# Patient Record
Sex: FEMALE | Race: ASIAN | NOT HISPANIC OR LATINO
[De-identification: names, ages, dates, MRNs, and addresses within clinical notes are randomized per-mention and may not be internally consistent; named-entity substitution may affect disease eponyms.]

---

## 2021-07-12 ENCOUNTER — APPOINTMENT (OUTPATIENT)
Dept: ULTRASOUND IMAGING | Facility: IMAGING CENTER | Age: 66
End: 2021-07-12
Payer: MEDICARE

## 2021-07-12 ENCOUNTER — OUTPATIENT (OUTPATIENT)
Dept: OUTPATIENT SERVICES | Facility: HOSPITAL | Age: 66
LOS: 1 days | End: 2021-07-12
Payer: MEDICARE

## 2021-07-12 DIAGNOSIS — Z00.8 ENCOUNTER FOR OTHER GENERAL EXAMINATION: ICD-10-CM

## 2021-07-12 PROCEDURE — 76700 US EXAM ABDOM COMPLETE: CPT | Mod: 26

## 2021-07-12 PROCEDURE — 76700 US EXAM ABDOM COMPLETE: CPT

## 2021-08-03 PROBLEM — Z00.00 ENCOUNTER FOR PREVENTIVE HEALTH EXAMINATION: Status: ACTIVE | Noted: 2021-08-03

## 2023-05-08 ENCOUNTER — NON-APPOINTMENT (OUTPATIENT)
Age: 68
End: 2023-05-08

## 2023-05-09 ENCOUNTER — APPOINTMENT (OUTPATIENT)
Dept: OPHTHALMOLOGY | Facility: CLINIC | Age: 68
End: 2023-05-09
Payer: MEDICARE

## 2023-05-09 ENCOUNTER — NON-APPOINTMENT (OUTPATIENT)
Age: 68
End: 2023-05-09

## 2023-05-09 PROCEDURE — 92004 COMPRE OPH EXAM NEW PT 1/>: CPT

## 2023-05-09 PROCEDURE — 92286 ANT SGM IMG I&R SPECLR MIC: CPT

## 2023-05-09 PROCEDURE — 92202 OPSCPY EXTND ON/MAC DRAW: CPT

## 2023-08-15 ENCOUNTER — NON-APPOINTMENT (OUTPATIENT)
Age: 68
End: 2023-08-15

## 2023-08-15 ENCOUNTER — APPOINTMENT (OUTPATIENT)
Dept: OPHTHALMOLOGY | Facility: CLINIC | Age: 68
End: 2023-08-15
Payer: MEDICARE

## 2023-08-15 PROCEDURE — 92012 INTRM OPH EXAM EST PATIENT: CPT

## 2023-08-15 PROCEDURE — 92134 CPTRZ OPH DX IMG PST SGM RTA: CPT

## 2024-03-27 ENCOUNTER — OUTPATIENT (OUTPATIENT)
Dept: OUTPATIENT SERVICES | Facility: HOSPITAL | Age: 69
LOS: 1 days | End: 2024-03-27
Payer: MEDICARE

## 2024-03-27 ENCOUNTER — APPOINTMENT (OUTPATIENT)
Dept: CT IMAGING | Facility: IMAGING CENTER | Age: 69
End: 2024-03-27
Payer: MEDICARE

## 2024-03-27 DIAGNOSIS — R10.11 RIGHT UPPER QUADRANT PAIN: ICD-10-CM

## 2024-03-27 DIAGNOSIS — R10.10 UPPER ABDOMINAL PAIN, UNSPECIFIED: ICD-10-CM

## 2024-03-27 PROCEDURE — 74160 CT ABDOMEN W/CONTRAST: CPT | Mod: 26

## 2024-03-27 PROCEDURE — 74160 CT ABDOMEN W/CONTRAST: CPT

## 2024-04-02 ENCOUNTER — APPOINTMENT (OUTPATIENT)
Dept: CT IMAGING | Facility: IMAGING CENTER | Age: 69
End: 2024-04-02

## 2024-04-08 ENCOUNTER — APPOINTMENT (OUTPATIENT)
Dept: GASTROENTEROLOGY | Facility: CLINIC | Age: 69
End: 2024-04-08
Payer: MEDICARE

## 2024-04-08 DIAGNOSIS — K83.8 OTHER SPECIFIED DISEASES OF BILIARY TRACT: ICD-10-CM

## 2024-04-08 DIAGNOSIS — K86.2 CYST OF PANCREAS: ICD-10-CM

## 2024-04-08 PROCEDURE — 99443: CPT

## 2024-04-09 ENCOUNTER — APPOINTMENT (OUTPATIENT)
Dept: GASTROENTEROLOGY | Facility: HOSPITAL | Age: 69
End: 2024-04-09

## 2024-04-16 ENCOUNTER — OUTPATIENT (OUTPATIENT)
Dept: OUTPATIENT SERVICES | Facility: HOSPITAL | Age: 69
LOS: 1 days | End: 2024-04-16

## 2024-04-16 VITALS
TEMPERATURE: 98 F | OXYGEN SATURATION: 97 % | WEIGHT: 117.95 LBS | SYSTOLIC BLOOD PRESSURE: 135 MMHG | RESPIRATION RATE: 16 BRPM | DIASTOLIC BLOOD PRESSURE: 79 MMHG | HEIGHT: 60 IN | HEART RATE: 74 BPM

## 2024-04-16 DIAGNOSIS — K83.8 OTHER SPECIFIED DISEASES OF BILIARY TRACT: ICD-10-CM

## 2024-04-16 DIAGNOSIS — Z86.73 PERSONAL HISTORY OF TRANSIENT ISCHEMIC ATTACK (TIA), AND CEREBRAL INFARCTION WITHOUT RESIDUAL DEFICITS: ICD-10-CM

## 2024-04-16 DIAGNOSIS — E11.9 TYPE 2 DIABETES MELLITUS WITHOUT COMPLICATIONS: ICD-10-CM

## 2024-04-16 DIAGNOSIS — K86.2 CYST OF PANCREAS: ICD-10-CM

## 2024-04-16 DIAGNOSIS — I10 ESSENTIAL (PRIMARY) HYPERTENSION: ICD-10-CM

## 2024-04-16 DIAGNOSIS — Z90.89 ACQUIRED ABSENCE OF OTHER ORGANS: Chronic | ICD-10-CM

## 2024-04-16 RX ORDER — METFORMIN HYDROCHLORIDE 850 MG/1
2 TABLET ORAL
Refills: 0 | DISCHARGE

## 2024-04-16 RX ORDER — SODIUM CHLORIDE 9 MG/ML
3 INJECTION INTRAMUSCULAR; INTRAVENOUS; SUBCUTANEOUS EVERY 8 HOURS
Refills: 0 | Status: DISCONTINUED | OUTPATIENT
Start: 2024-04-18 | End: 2024-05-02

## 2024-04-16 RX ORDER — SODIUM CHLORIDE 9 MG/ML
1000 INJECTION, SOLUTION INTRAVENOUS
Refills: 0 | Status: DISCONTINUED | OUTPATIENT
Start: 2024-04-18 | End: 2024-05-02

## 2024-04-16 NOTE — H&P PST ADULT - HISTORY OF PRESENT ILLNESS
69 year old female with c/o abdominal pain, had CT scan done a few weeks ago which revealed dilated bile duct and pancreatic cyst. Pt presents today for presurgical evaluation for ... 69 year old female with c/o abdominal pain, had CT scan done a few weeks ago which revealed dilated bile duct and pancreatic cyst. Pt presents today for presurgical evaluation for Endoscopic Ultrasound.

## 2024-04-16 NOTE — H&P PST ADULT - NSICDXPASTMEDICALHX_GEN_ALL_CORE_FT
PAST MEDICAL HISTORY:  Asthma     Diabetes     H/O fracture of wrist     Hypertension     TIA (transient ischemic attack)     Uterine fibroid

## 2024-04-16 NOTE — H&P PST ADULT - PROBLEM SELECTOR PLAN 3
Pt states Farxiga was discontinued by PCP (last dose was 4/11/24).   Pt instructed to hold Glipizide and Metformin the morning of surgery.

## 2024-04-16 NOTE — H&P PST ADULT - PROBLEM SELECTOR PLAN 1
Pre-op instructions provided. Pt verbalized understanding.   Pt to take own Pepcid for GI ppx.   Labs from 3/25/24 in chart.

## 2024-04-16 NOTE — H&P PST ADULT - NSICDXPASTSURGICALHX_GEN_ALL_CORE_FT
PAST SURGICAL HISTORY:  Delivery By Elective Caesarean Section     H/O abdominal hysterectomy     Hx of cholecystectomy     S/P tonsillectomy     Tubal ligation status

## 2024-04-17 PROBLEM — Z87.81 PERSONAL HISTORY OF (HEALED) TRAUMATIC FRACTURE: Chronic | Status: ACTIVE | Noted: 2024-04-16

## 2024-04-17 PROBLEM — D25.9 LEIOMYOMA OF UTERUS, UNSPECIFIED: Chronic | Status: ACTIVE | Noted: 2024-04-16

## 2024-04-18 ENCOUNTER — TRANSCRIPTION ENCOUNTER (OUTPATIENT)
Age: 69
End: 2024-04-18

## 2024-04-18 ENCOUNTER — OUTPATIENT (OUTPATIENT)
Dept: OUTPATIENT SERVICES | Facility: HOSPITAL | Age: 69
LOS: 1 days | Discharge: HOME | End: 2024-04-18
Payer: MEDICARE

## 2024-04-18 ENCOUNTER — RESULT REVIEW (OUTPATIENT)
Age: 69
End: 2024-04-18

## 2024-04-18 ENCOUNTER — APPOINTMENT (OUTPATIENT)
Dept: GASTROENTEROLOGY | Facility: HOSPITAL | Age: 69
End: 2024-04-18

## 2024-04-18 VITALS
DIASTOLIC BLOOD PRESSURE: 78 MMHG | RESPIRATION RATE: 16 BRPM | OXYGEN SATURATION: 98 % | SYSTOLIC BLOOD PRESSURE: 141 MMHG | WEIGHT: 119.05 LBS | TEMPERATURE: 97 F | HEART RATE: 70 BPM

## 2024-04-18 VITALS
OXYGEN SATURATION: 100 % | SYSTOLIC BLOOD PRESSURE: 138 MMHG | DIASTOLIC BLOOD PRESSURE: 58 MMHG | HEART RATE: 58 BPM | RESPIRATION RATE: 12 BRPM

## 2024-04-18 DIAGNOSIS — Z90.89 ACQUIRED ABSENCE OF OTHER ORGANS: Chronic | ICD-10-CM

## 2024-04-18 DIAGNOSIS — K86.2 CYST OF PANCREAS: ICD-10-CM

## 2024-04-18 LAB — GLUCOSE BLDC GLUCOMTR-MCNC: 272 MG/DL — HIGH (ref 70–99)

## 2024-04-18 PROCEDURE — 43237 ENDOSCOPIC US EXAM ESOPH: CPT

## 2024-04-18 PROCEDURE — 88305 TISSUE EXAM BY PATHOLOGIST: CPT | Mod: 26

## 2024-04-18 PROCEDURE — 43239 EGD BIOPSY SINGLE/MULTIPLE: CPT

## 2024-04-18 RX ORDER — ATORVASTATIN CALCIUM 80 MG/1
1 TABLET, FILM COATED ORAL
Refills: 0 | DISCHARGE

## 2024-04-18 RX ORDER — DAPAGLIFLOZIN 10 MG/1
1 TABLET, FILM COATED ORAL
Refills: 0 | DISCHARGE

## 2024-04-18 RX ORDER — FAMOTIDINE 10 MG/ML
1 INJECTION INTRAVENOUS
Refills: 0 | DISCHARGE

## 2024-04-18 RX ORDER — IBUPROFEN 200 MG
1 TABLET ORAL
Refills: 0 | DISCHARGE

## 2024-04-18 RX ORDER — METFORMIN HYDROCHLORIDE 850 MG/1
2 TABLET ORAL
Refills: 0 | DISCHARGE

## 2024-04-18 RX ORDER — LOSARTAN POTASSIUM 100 MG/1
1 TABLET, FILM COATED ORAL
Refills: 0 | DISCHARGE

## 2024-04-18 RX ORDER — ALBUTEROL 90 UG/1
2 AEROSOL, METERED ORAL
Refills: 0 | DISCHARGE

## 2024-04-18 RX ORDER — CLOPIDOGREL BISULFATE 75 MG/1
1 TABLET, FILM COATED ORAL
Refills: 0 | DISCHARGE

## 2024-04-18 RX ORDER — BUDESONIDE AND FORMOTEROL FUMARATE DIHYDRATE 160; 4.5 UG/1; UG/1
2 AEROSOL RESPIRATORY (INHALATION)
Refills: 0 | DISCHARGE

## 2024-04-18 RX ORDER — FERROUS GLUCONATE 100 %
0 POWDER (GRAM) MISCELLANEOUS
Refills: 0 | DISCHARGE

## 2024-04-18 RX ORDER — HYDROCHLOROTHIAZIDE 25 MG
1 TABLET ORAL
Refills: 0 | DISCHARGE

## 2024-04-18 NOTE — PRE PROCEDURE NOTE - PRE PROCEDURE EVALUATION
Attending Physician:  Dr. Singh    Procedure: EUS    Indication for Procedure: Abnormal CT  ________________________________________________________  PAST MEDICAL & SURGICAL HISTORY:  Diabetes      Hypertension      Asthma      TIA (transient ischemic attack)      Uterine fibroid      H/O fracture of wrist      Tubal ligation status      H/O abdominal hysterectomy      Hx of cholecystectomy      Delivery By Elective Caesarean Section      S/P tonsillectomy        ALLERGIES:  Tylenol (Hives)    HOME MEDICATIONS:  Albuterol (Eqv-ProAir HFA) 90 mcg/inh inhalation aerosol: 2 puff(s) inhaled every 4 hours as needed for  shortness of breath and/or wheezing  atorvastatin 20 mg oral tablet: 1 tab(s) orally once a day  calcium + Vitamin D: 1 tab daily  clopidogrel 75 mg oral tablet: 1 tab(s) orally once a day  famotidine 20 mg oral tablet: 1 tab(s) orally 2 times a day  Farxiga 10 mg oral tablet: 1 tab(s) orally once a day last dose taken on 4/11/24 (medication was discontinued)  ferrous gluconate 325 mg (36 mg elemental iron) oral tablet: orally once a day  GlipiZIDE XL 10 mg oral tablet, extended release: 1 tab(s) orally once a day  hydroCHLOROthiazide 12.5 mg oral tablet: 1 tab(s) orally once a day  ibuprofen 400 mg oral tablet: 1 tab(s) orally every 8 hours as needed for  moderate pain  losartan 50 mg oral tablet: 1 tab(s) orally once a day  MetFORMIN (Eqv-Fortamet) 500 mg oral tablet, extended release: 2 tab(s) orally 2 times a day  Multiple Vitamins oral tablet: 1 tab(s) orally once a day  Symbicort 160 mcg-4.5 mcg/inh inhalation aerosol: 2 puff(s) inhaled 2 times a day as needed for  shortness of breath and/or wheezing  Vitamin B 12 1 tab daily:   Vitamin E  1 tab daily:     AICD/PPM: [ ] yes   [ ] no    PERTINENT LAB DATA:                      PHYSICAL EXAMINATION:      Weight (kg): 54T(C): 36.7  HR: 60  BP: 132/57  RR: 13  SpO2: 97%    Constitutional: NAD  HEENT: PERRLA, EOMI,    Neck:  No JVD  Respiratory: CTAB/L  Cardiovascular: S1 and S2  Gastrointestinal: BS+, soft, NT/ND  Extremities: No peripheral edema  Neurological: A/O x 3, no focal deficits  Psychiatric: Normal mood, normal affect  Skin: No rashes    ASA Class: I [ ]  II [ ]  III [x]  IV [ ]    COMMENTS:    The patient is a suitable candidate for the planned procedure unless box checked [ ]  No, explain:    Risks and alternatives to the procedure discussed in detail. All questions answered.

## 2024-04-24 LAB — SURGICAL PATHOLOGY STUDY: SIGNIFICANT CHANGE UP

## 2024-04-25 ENCOUNTER — NON-APPOINTMENT (OUTPATIENT)
Age: 69
End: 2024-04-25

## 2024-05-08 ENCOUNTER — APPOINTMENT (OUTPATIENT)
Dept: MRI IMAGING | Facility: IMAGING CENTER | Age: 69
End: 2024-05-08
Payer: MEDICARE

## 2024-05-08 ENCOUNTER — OUTPATIENT (OUTPATIENT)
Dept: OUTPATIENT SERVICES | Facility: HOSPITAL | Age: 69
LOS: 1 days | End: 2024-05-08
Payer: MEDICARE

## 2024-05-08 DIAGNOSIS — Z00.8 ENCOUNTER FOR OTHER GENERAL EXAMINATION: ICD-10-CM

## 2024-05-08 DIAGNOSIS — Z90.89 ACQUIRED ABSENCE OF OTHER ORGANS: Chronic | ICD-10-CM

## 2024-05-08 PROCEDURE — A9585: CPT

## 2024-05-08 PROCEDURE — 74183 MRI ABD W/O CNTR FLWD CNTR: CPT | Mod: 26

## 2024-05-08 PROCEDURE — 74183 MRI ABD W/O CNTR FLWD CNTR: CPT

## 2024-05-13 ENCOUNTER — APPOINTMENT (OUTPATIENT)
Dept: GASTROENTEROLOGY | Facility: HOSPITAL | Age: 69
End: 2024-05-13

## 2024-06-20 ENCOUNTER — APPOINTMENT (OUTPATIENT)
Dept: PULMONOLOGY | Facility: CLINIC | Age: 69
End: 2024-06-20
Payer: MEDICARE

## 2024-06-20 VITALS
HEIGHT: 60 IN | TEMPERATURE: 96.8 F | HEART RATE: 88 BPM | WEIGHT: 122 LBS | OXYGEN SATURATION: 98 % | SYSTOLIC BLOOD PRESSURE: 112 MMHG | DIASTOLIC BLOOD PRESSURE: 68 MMHG | BODY MASS INDEX: 23.95 KG/M2

## 2024-06-20 DIAGNOSIS — J45.909 UNSPECIFIED ASTHMA, UNCOMPLICATED: ICD-10-CM

## 2024-06-20 DIAGNOSIS — R91.1 SOLITARY PULMONARY NODULE: ICD-10-CM

## 2024-06-20 PROCEDURE — 99204 OFFICE O/P NEW MOD 45 MIN: CPT | Mod: 25

## 2024-06-20 PROCEDURE — 94729 DIFFUSING CAPACITY: CPT

## 2024-06-20 PROCEDURE — 94727 GAS DIL/WSHOT DETER LNG VOL: CPT

## 2024-06-20 PROCEDURE — 94060 EVALUATION OF WHEEZING: CPT

## 2024-06-20 NOTE — HISTORY OF PRESENT ILLNESS
[Never] : never [TextBox_4] : She is a 69-year-old woman with a history of asthma.  She has had asthma all of her life.  She has been on Symbicort and albuterol.  She has not been using these medications regularly.  She has not used either one of the inhalers in over 1 month.  There is no complaint of cough, wheezing or shortness of breath.  No sinus symptoms.  No GERD.  No chest pain or pressure.  She never smoked.  She was evaluated for abdominal pain.  CT of the abdomen showed an incidental finding of a left lower lobe pulmonary nodule.  No other nodules were reported.   [Awakes Unrefreshed] : does not awaken unrefreshed [Difficulty Maintaining Sleep] : does not have difficulty maintaining sleep

## 2024-06-20 NOTE — PHYSICAL EXAM
[No Acute Distress] : no acute distress [Normal Oropharynx] : normal oropharynx [Normal Appearance] : normal appearance [Normal Rate/Rhythm] : normal rate/rhythm [Normal S1, S2] : normal s1, s2 [No Resp Distress] : no resp distress [Clear to Auscultation Bilaterally] : clear to auscultation bilaterally [No HSM] : no hsm [Normal Gait] : normal gait [No Clubbing] : no clubbing [No Cyanosis] : no cyanosis [No Edema] : no edema [Normal Turgor] : normal turgor [No Focal Deficits] : no focal deficits [Oriented x3] : oriented x3 [Normal Affect] : normal affect

## 2024-06-20 NOTE — DISCUSSION/SUMMARY
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

## 2024-06-20 NOTE — PROCEDURE
[FreeTextEntry1] : PFT 6/20/24: Small airways disease.  Lung volumes were normal.  Diffusion was normal.  Significant improvement noted after inhalation of a bronchodilator.  CT Chest 3/27/24: 3 mm nodule in the left lower lobe.  See report for the additional abdominal findings.

## 2024-06-20 NOTE — REVIEW OF SYSTEMS
[Diabetes] : diabetes [Fever] : no fever [Fatigue] : no fatigue [Nasal Congestion] : no nasal congestion [Sinus Problems] : no sinus problems [Cough] : no cough [Sputum] : no sputum [Dyspnea] : no dyspnea [Chest Discomfort] : no chest discomfort [Edema] : no edema [Hay Fever] : no hay fever [GERD] : no gerd [Myalgias] : no myalgias [Rash] : no rash [Anemia] : no anemia [Headache] : no headache [Thyroid Problem] : no thyroid problem

## 2024-06-20 NOTE — DISCUSSION/SUMMARY
[FreeTextEntry1] : Impression 3 mm pulmonary nodule -Incidental finding on abdominal CT from March 2024 History of asthma -Reversible airways disease noted on PFT Never smoked  Recommend Follow-up CT at 1 year interval Continue with albuterol as needed -Can resume Symbicort which she was not using if the albuterol use becomes regular I will see her again in 9 months -Sooner if necessary

## 2024-11-04 ENCOUNTER — NON-APPOINTMENT (OUTPATIENT)
Age: 69
End: 2024-11-04

## 2024-11-04 ENCOUNTER — APPOINTMENT (OUTPATIENT)
Dept: OPHTHALMOLOGY | Facility: CLINIC | Age: 69
End: 2024-11-04
Payer: MEDICARE

## 2024-11-04 PROCEDURE — 92134 CPTRZ OPH DX IMG PST SGM RTA: CPT

## 2024-11-04 PROCEDURE — 92014 COMPRE OPH EXAM EST PT 1/>: CPT

## 2025-03-04 ENCOUNTER — APPOINTMENT (OUTPATIENT)
Dept: CT IMAGING | Facility: IMAGING CENTER | Age: 70
End: 2025-03-04
Payer: MEDICARE

## 2025-03-04 ENCOUNTER — OUTPATIENT (OUTPATIENT)
Dept: OUTPATIENT SERVICES | Facility: HOSPITAL | Age: 70
LOS: 1 days | End: 2025-03-04
Payer: COMMERCIAL

## 2025-03-04 DIAGNOSIS — R91.1 SOLITARY PULMONARY NODULE: ICD-10-CM

## 2025-03-04 DIAGNOSIS — Z90.89 ACQUIRED ABSENCE OF OTHER ORGANS: Chronic | ICD-10-CM

## 2025-03-04 DIAGNOSIS — Z00.8 ENCOUNTER FOR OTHER GENERAL EXAMINATION: ICD-10-CM

## 2025-03-04 PROCEDURE — 71250 CT THORAX DX C-: CPT | Mod: 26

## 2025-03-04 PROCEDURE — 71250 CT THORAX DX C-: CPT

## 2025-03-19 ENCOUNTER — APPOINTMENT (OUTPATIENT)
Dept: PULMONOLOGY | Facility: CLINIC | Age: 70
End: 2025-03-19

## 2025-03-19 VITALS
SYSTOLIC BLOOD PRESSURE: 136 MMHG | HEIGHT: 60 IN | DIASTOLIC BLOOD PRESSURE: 70 MMHG | HEART RATE: 97 BPM | OXYGEN SATURATION: 96 % | BODY MASS INDEX: 24.54 KG/M2 | TEMPERATURE: 97.7 F | WEIGHT: 125 LBS

## 2025-03-19 DIAGNOSIS — R91.1 SOLITARY PULMONARY NODULE: ICD-10-CM

## 2025-03-19 DIAGNOSIS — J47.9 BRONCHIECTASIS, UNCOMPLICATED: ICD-10-CM

## 2025-03-19 PROCEDURE — 99214 OFFICE O/P EST MOD 30 MIN: CPT | Mod: 25

## 2025-03-19 PROCEDURE — 94060 EVALUATION OF WHEEZING: CPT

## 2025-03-19 RX ORDER — EMPAGLIFLOZIN AND METFORMIN HYDROCHLORIDE 12.5; 1 MG/1; MG/1
TABLET ORAL
Refills: 0 | Status: ACTIVE | COMMUNITY

## 2025-03-19 RX ORDER — BUDESONIDE AND FORMOTEROL FUMARATE DIHYDRATE 160; 4.5 UG/1; UG/1
160-4.5 AEROSOL RESPIRATORY (INHALATION)
Refills: 0 | Status: ACTIVE | COMMUNITY

## 2025-03-19 RX ORDER — SEMAGLUTIDE 2.68 MG/ML
INJECTION, SOLUTION SUBCUTANEOUS
Refills: 0 | Status: ACTIVE | COMMUNITY

## 2025-03-19 RX ORDER — ATORVASTATIN CALCIUM 10 MG/1
10 TABLET, FILM COATED ORAL
Refills: 0 | Status: ACTIVE | COMMUNITY

## 2025-03-19 RX ORDER — LOSARTAN POTASSIUM 50 MG/1
50 TABLET, FILM COATED ORAL
Refills: 0 | Status: ACTIVE | COMMUNITY

## 2025-03-19 RX ORDER — ALBUTEROL SULFATE 90 UG/1
108 (90 BASE) INHALANT RESPIRATORY (INHALATION)
Refills: 0 | Status: ACTIVE | COMMUNITY

## 2025-05-04 ENCOUNTER — INPATIENT (INPATIENT)
Facility: HOSPITAL | Age: 70
LOS: 4 days | Discharge: HOME HEALTH | End: 2025-05-09
Attending: HOSPITALIST | Admitting: HOSPITALIST
Payer: MEDICARE

## 2025-05-04 VITALS
HEIGHT: 60 IN | DIASTOLIC BLOOD PRESSURE: 71 MMHG | OXYGEN SATURATION: 96 % | WEIGHT: 119.05 LBS | HEART RATE: 131 BPM | RESPIRATION RATE: 18 BRPM | SYSTOLIC BLOOD PRESSURE: 111 MMHG | TEMPERATURE: 97 F

## 2025-05-04 DIAGNOSIS — E87.4 MIXED DISORDER OF ACID-BASE BALANCE: ICD-10-CM

## 2025-05-04 DIAGNOSIS — Z90.89 ACQUIRED ABSENCE OF OTHER ORGANS: Chronic | ICD-10-CM

## 2025-05-04 LAB
ADD ON TEST-SPECIMEN IN LAB: SIGNIFICANT CHANGE UP
ALBUMIN SERPL ELPH-MCNC: 4.7 G/DL — SIGNIFICANT CHANGE UP (ref 3.3–5)
ALP SERPL-CCNC: 47 U/L — SIGNIFICANT CHANGE UP (ref 40–120)
ALT FLD-CCNC: 20 U/L — SIGNIFICANT CHANGE UP (ref 4–33)
ANION GAP SERPL CALC-SCNC: 33 MMOL/L — HIGH (ref 7–14)
ANION GAP SERPL CALC-SCNC: >34 MMOL/L — HIGH (ref 7–14)
ANION GAP SERPL CALC-SCNC: >38 MMOL/L — HIGH (ref 7–14)
APPEARANCE UR: CLEAR — SIGNIFICANT CHANGE UP
AST SERPL-CCNC: 79 U/L — HIGH (ref 4–32)
BACTERIA # UR AUTO: ABNORMAL /HPF
BASOPHILS # BLD AUTO: 0.1 K/UL — SIGNIFICANT CHANGE UP (ref 0–0.2)
BASOPHILS NFR BLD AUTO: 0.5 % — SIGNIFICANT CHANGE UP (ref 0–2)
BILIRUB SERPL-MCNC: 0.2 MG/DL — SIGNIFICANT CHANGE UP (ref 0.2–1.2)
BILIRUB UR-MCNC: NEGATIVE — SIGNIFICANT CHANGE UP
BLOOD GAS VENOUS COMPREHENSIVE RESULT: SIGNIFICANT CHANGE UP
BLOOD GAS VENOUS COMPREHENSIVE RESULT: SIGNIFICANT CHANGE UP
BUN SERPL-MCNC: 15 MG/DL — SIGNIFICANT CHANGE UP (ref 7–23)
BUN SERPL-MCNC: 18 MG/DL — SIGNIFICANT CHANGE UP (ref 7–23)
BUN SERPL-MCNC: 18 MG/DL — SIGNIFICANT CHANGE UP (ref 7–23)
CALCIUM SERPL-MCNC: 10.1 MG/DL — SIGNIFICANT CHANGE UP (ref 8.4–10.5)
CALCIUM SERPL-MCNC: 7.4 MG/DL — LOW (ref 8.4–10.5)
CALCIUM SERPL-MCNC: 8.3 MG/DL — LOW (ref 8.4–10.5)
CAST: 4 /LPF — SIGNIFICANT CHANGE UP (ref 0–4)
CHLORIDE SERPL-SCNC: 105 MMOL/L — SIGNIFICANT CHANGE UP (ref 98–107)
CHLORIDE SERPL-SCNC: 105 MMOL/L — SIGNIFICANT CHANGE UP (ref 98–107)
CHLORIDE SERPL-SCNC: 94 MMOL/L — LOW (ref 98–107)
CO2 SERPL-SCNC: 7 MMOL/L — CRITICAL LOW (ref 22–31)
CO2 SERPL-SCNC: <7 MMOL/L — CRITICAL LOW (ref 22–31)
CO2 SERPL-SCNC: <7 MMOL/L — CRITICAL LOW (ref 22–31)
COLOR SPEC: YELLOW — SIGNIFICANT CHANGE UP
CREAT SERPL-MCNC: 0.71 MG/DL — SIGNIFICANT CHANGE UP (ref 0.5–1.3)
CREAT SERPL-MCNC: 0.84 MG/DL — SIGNIFICANT CHANGE UP (ref 0.5–1.3)
CREAT SERPL-MCNC: 0.94 MG/DL — SIGNIFICANT CHANGE UP (ref 0.5–1.3)
DIFF PNL FLD: NEGATIVE — SIGNIFICANT CHANGE UP
EGFR: 65 ML/MIN/1.73M2 — SIGNIFICANT CHANGE UP
EGFR: 65 ML/MIN/1.73M2 — SIGNIFICANT CHANGE UP
EGFR: 74 ML/MIN/1.73M2 — SIGNIFICANT CHANGE UP
EGFR: 74 ML/MIN/1.73M2 — SIGNIFICANT CHANGE UP
EGFR: 91 ML/MIN/1.73M2 — SIGNIFICANT CHANGE UP
EGFR: 91 ML/MIN/1.73M2 — SIGNIFICANT CHANGE UP
EOSINOPHIL # BLD AUTO: 0.01 K/UL — SIGNIFICANT CHANGE UP (ref 0–0.5)
EOSINOPHIL NFR BLD AUTO: 0 % — SIGNIFICANT CHANGE UP (ref 0–6)
GLUCOSE BLDC GLUCOMTR-MCNC: 225 MG/DL — HIGH (ref 70–99)
GLUCOSE BLDC GLUCOMTR-MCNC: 249 MG/DL — HIGH (ref 70–99)
GLUCOSE BLDC GLUCOMTR-MCNC: 272 MG/DL — HIGH (ref 70–99)
GLUCOSE SERPL-MCNC: 255 MG/DL — HIGH (ref 70–99)
GLUCOSE SERPL-MCNC: 272 MG/DL — HIGH (ref 70–99)
GLUCOSE SERPL-MCNC: 304 MG/DL — HIGH (ref 70–99)
GLUCOSE UR QL: >=1000 MG/DL
HCT VFR BLD CALC: 53.2 % — HIGH (ref 34.5–45)
HGB BLD-MCNC: 16.6 G/DL — HIGH (ref 11.5–15.5)
IANC: 17.92 K/UL — HIGH (ref 1.8–7.4)
IMM GRANULOCYTES NFR BLD AUTO: 1.6 % — HIGH (ref 0–0.9)
KETONES UR-MCNC: >=160 MG/DL
LEUKOCYTE ESTERASE UR-ACNC: ABNORMAL
LYMPHOCYTES # BLD AUTO: 1.99 K/UL — SIGNIFICANT CHANGE UP (ref 1–3.3)
LYMPHOCYTES # BLD AUTO: 9.4 % — LOW (ref 13–44)
MAGNESIUM SERPL-MCNC: 1.8 MG/DL — SIGNIFICANT CHANGE UP (ref 1.6–2.6)
MAGNESIUM SERPL-MCNC: 2.5 MG/DL — SIGNIFICANT CHANGE UP (ref 1.6–2.6)
MCHC RBC-ENTMCNC: 28.8 PG — SIGNIFICANT CHANGE UP (ref 27–34)
MCHC RBC-ENTMCNC: 31.2 G/DL — LOW (ref 32–36)
MCV RBC AUTO: 92.2 FL — SIGNIFICANT CHANGE UP (ref 80–100)
MONOCYTES # BLD AUTO: 0.83 K/UL — SIGNIFICANT CHANGE UP (ref 0–0.9)
MONOCYTES NFR BLD AUTO: 3.9 % — SIGNIFICANT CHANGE UP (ref 2–14)
NEUTROPHILS # BLD AUTO: 17.92 K/UL — HIGH (ref 1.8–7.4)
NEUTROPHILS NFR BLD AUTO: 84.6 % — HIGH (ref 43–77)
NITRITE UR-MCNC: NEGATIVE — SIGNIFICANT CHANGE UP
NRBC # BLD AUTO: 0 K/UL — SIGNIFICANT CHANGE UP (ref 0–0)
NRBC # FLD: 0 K/UL — SIGNIFICANT CHANGE UP (ref 0–0)
NRBC BLD AUTO-RTO: 0 /100 WBCS — SIGNIFICANT CHANGE UP (ref 0–0)
PH UR: 5.5 — SIGNIFICANT CHANGE UP (ref 5–8)
PHOSPHATE SERPL-MCNC: 3.7 MG/DL — SIGNIFICANT CHANGE UP (ref 2.5–4.5)
PHOSPHATE SERPL-MCNC: 6.9 MG/DL — HIGH (ref 2.5–4.5)
PLATELET # BLD AUTO: 413 K/UL — HIGH (ref 150–400)
POTASSIUM SERPL-MCNC: 4.7 MMOL/L — SIGNIFICANT CHANGE UP (ref 3.5–5.3)
POTASSIUM SERPL-MCNC: 4.9 MMOL/L — SIGNIFICANT CHANGE UP (ref 3.5–5.3)
POTASSIUM SERPL-MCNC: SIGNIFICANT CHANGE UP MMOL/L (ref 3.5–5.3)
POTASSIUM SERPL-SCNC: 4.7 MMOL/L — SIGNIFICANT CHANGE UP (ref 3.5–5.3)
POTASSIUM SERPL-SCNC: 4.9 MMOL/L — SIGNIFICANT CHANGE UP (ref 3.5–5.3)
POTASSIUM SERPL-SCNC: SIGNIFICANT CHANGE UP MMOL/L (ref 3.5–5.3)
PROT SERPL-MCNC: 8.3 G/DL — SIGNIFICANT CHANGE UP (ref 6–8.3)
PROT UR-MCNC: 30 MG/DL
RBC # BLD: 5.77 M/UL — HIGH (ref 3.8–5.2)
RBC # FLD: 14.3 % — SIGNIFICANT CHANGE UP (ref 10.3–14.5)
RBC CASTS # UR COMP ASSIST: 0 /HPF — SIGNIFICANT CHANGE UP (ref 0–4)
SODIUM SERPL-SCNC: 139 MMOL/L — SIGNIFICANT CHANGE UP (ref 135–145)
SODIUM SERPL-SCNC: 145 MMOL/L — SIGNIFICANT CHANGE UP (ref 135–145)
SODIUM SERPL-SCNC: 146 MMOL/L — HIGH (ref 135–145)
SP GR SPEC: 1.02 — SIGNIFICANT CHANGE UP (ref 1–1.03)
SQUAMOUS # UR AUTO: 2 /HPF — SIGNIFICANT CHANGE UP (ref 0–5)
TROPONIN T, HIGH SENSITIVITY RESULT: 15 NG/L — SIGNIFICANT CHANGE UP
UROBILINOGEN FLD QL: 0.2 MG/DL — SIGNIFICANT CHANGE UP (ref 0.2–1)
WBC # BLD: 21.19 K/UL — HIGH (ref 3.8–10.5)
WBC # FLD AUTO: 21.19 K/UL — HIGH (ref 3.8–10.5)
WBC UR QL: 24 /HPF — HIGH (ref 0–5)

## 2025-05-04 PROCEDURE — 71045 X-RAY EXAM CHEST 1 VIEW: CPT | Mod: 26

## 2025-05-04 PROCEDURE — 76937 US GUIDE VASCULAR ACCESS: CPT | Mod: 26,59

## 2025-05-04 PROCEDURE — 99291 CRITICAL CARE FIRST HOUR: CPT | Mod: GC

## 2025-05-04 PROCEDURE — 36569 INSJ PICC 5 YR+ W/O IMAGING: CPT

## 2025-05-04 PROCEDURE — 99291 CRITICAL CARE FIRST HOUR: CPT

## 2025-05-04 RX ORDER — ALBUTEROL SULFATE 2.5 MG/3ML
2 VIAL, NEBULIZER (ML) INHALATION EVERY 6 HOURS
Refills: 0 | Status: DISCONTINUED | OUTPATIENT
Start: 2025-05-04 | End: 2025-05-09

## 2025-05-04 RX ORDER — SODIUM BICARBONATE 1 MEQ/ML
0.69 SYRINGE (ML) INTRAVENOUS
Qty: 150 | Refills: 0 | Status: DISCONTINUED | OUTPATIENT
Start: 2025-05-04 | End: 2025-05-04

## 2025-05-04 RX ORDER — ATORVASTATIN CALCIUM 80 MG/1
20 TABLET, FILM COATED ORAL AT BEDTIME
Refills: 0 | Status: DISCONTINUED | OUTPATIENT
Start: 2025-05-04 | End: 2025-05-09

## 2025-05-04 RX ORDER — CLOPIDOGREL BISULFATE 75 MG/1
75 TABLET, FILM COATED ORAL EVERY 24 HOURS
Refills: 0 | Status: DISCONTINUED | OUTPATIENT
Start: 2025-05-04 | End: 2025-05-09

## 2025-05-04 RX ORDER — SODIUM CHLORIDE 9 G/1000ML
1000 INJECTION, SOLUTION INTRAVENOUS
Refills: 0 | Status: DISCONTINUED | OUTPATIENT
Start: 2025-05-04 | End: 2025-05-05

## 2025-05-04 RX ORDER — ENOXAPARIN SODIUM 100 MG/ML
40 INJECTION SUBCUTANEOUS EVERY 24 HOURS
Refills: 0 | Status: DISCONTINUED | OUTPATIENT
Start: 2025-05-04 | End: 2025-05-09

## 2025-05-04 RX ORDER — DEXTROSE 50 % IN WATER 50 %
50 SYRINGE (ML) INTRAVENOUS
Refills: 0 | Status: DISCONTINUED | OUTPATIENT
Start: 2025-05-04 | End: 2025-05-09

## 2025-05-04 RX ORDER — SODIUM BICARBONATE 1 MEQ/ML
50 SYRINGE (ML) INTRAVENOUS ONCE
Refills: 0 | Status: COMPLETED | OUTPATIENT
Start: 2025-05-04 | End: 2025-05-04

## 2025-05-04 RX ORDER — B1/B2/B3/B5/B6/B12/VIT C/FOLIC 500-0.5 MG
1 TABLET ORAL DAILY
Refills: 0 | Status: DISCONTINUED | OUTPATIENT
Start: 2025-05-04 | End: 2025-05-09

## 2025-05-04 RX ORDER — IBUPROFEN 200 MG
400 TABLET ORAL ONCE
Refills: 0 | Status: COMPLETED | OUTPATIENT
Start: 2025-05-04 | End: 2025-05-04

## 2025-05-04 RX ORDER — ONDANSETRON HCL/PF 4 MG/2 ML
4 VIAL (ML) INJECTION ONCE
Refills: 0 | Status: COMPLETED | OUTPATIENT
Start: 2025-05-04 | End: 2025-05-04

## 2025-05-04 RX ADMIN — Medication 50 MILLIEQUIVALENT(S): at 17:26

## 2025-05-04 RX ADMIN — SODIUM CHLORIDE 150 MILLILITER(S): 9 INJECTION, SOLUTION INTRAVENOUS at 21:54

## 2025-05-04 RX ADMIN — Medication 5.4 UNIT(S)/HR: at 21:54

## 2025-05-04 RX ADMIN — Medication 1000 MILLILITER(S): at 17:41

## 2025-05-04 RX ADMIN — Medication 1000 MILLILITER(S): at 21:29

## 2025-05-04 RX ADMIN — Medication 2000 MILLILITER(S): at 15:08

## 2025-05-04 RX ADMIN — Medication 1000 MILLILITER(S): at 20:21

## 2025-05-04 RX ADMIN — Medication 250 MEQ/KG/HR: at 17:26

## 2025-05-04 RX ADMIN — Medication 400 MILLIGRAM(S): at 22:55

## 2025-05-04 RX ADMIN — Medication 4 MILLIGRAM(S): at 15:08

## 2025-05-04 RX ADMIN — Medication 1000 MILLILITER(S): at 15:41

## 2025-05-04 RX ADMIN — Medication 400 MILLIGRAM(S): at 23:55

## 2025-05-04 NOTE — ED PROVIDER NOTE - OBJECTIVE STATEMENT
70yoF PMH DM, recently increased on her ozempic a few days, ago, p/w multiple episodes of nbnb emesis starting 2 days ago, no abd pain, fevers, diarrhea, constipation, sick contacts. pt endorses increased thirst. son states pt does endorse some back pain. Pt denies chest pain, shortness of breath. pt appears lethargic, unwell appearing, kussmaul breathing. 70yoF PMH HTN, CVA no deficits on plavix, DM recently placed on SYnjardy (metformin/empagliflozin) 3 weeks ago, & increased on her ozempic a few days, ago, p/w multiple episodes of nbnb emesis starting 1 day ago, no abd pain, fevers, diarrhea, constipation, sick contacts. pt endorses increased thirst. son states pt does endorse some back pain. Pt denies chest pain, shortness of breath. pt appears lethargic, unwell appearing, kussmaul breathing.

## 2025-05-04 NOTE — ED ADULT NURSE REASSESSMENT NOTE - TEMPERATURE IN FAHRENHEIT (DEGREES F)
[facility]  Discharge Note    Pt discharged with followings belongings:   Dentures: None  Vision - Corrective Lenses: None  Hearing Aid: None  Jewelry: None  Body Piercings Removed: N/A  Clothing: None  Were All Patient Medications Collected?: Not Applicable  Other Valuables: None   Valuables sent home with patient. Valuables retrieved from safe and returned to patient. Patient education on aftercare instructions: yes. Information faxed to N/A by this writer. Patient verbalize understanding of AVS:  No evidence of learning. Status EXAM upon discharge:  Status and Exam  Normal: No  Facial Expression: Flat  Affect: Blunt  Level of Consciousness: Alert  Mood:Normal: No  Mood: Helpless  Motor Activity:Normal: No  Motor Activity: Decreased  Interview Behavior: Uncooperative/Withdrawn, Evasive  Preception: [place] to Person  Attention:Normal: No  Attention: Unable to Concentrate  Thought Processes: Blocking, Flt.of Ideas  Thought Content:Normal: No  Thought Content: Poverty of Content  Hallucinations: None(pt denies)  Delusions: No  Memory:Normal: No  Memory: Poor Recent, Poor Remote  Insight and Judgment: No  Insight and Judgment: Poor Judgment, Poor Insight  Present Suicidal Ideation: No  Present Homicidal Ideation: No      Metabolic Screening:    Lab Results   Component Value Date    LABA1C 4.5 04/08/2021       Lab Results   Component Value Date    CHOL 173 04/08/2021     Lab Results   Component Value Date    TRIG 47 04/08/2021     Lab Results   Component Value Date    HDL 52 04/08/2021     No components found for: [facility]  Lab Results   Component Value Date    LABVLDL 9 04/08/2021       Moiz Bruno RN    Bridge Appointment completed: Reviewed Discharge Instructions with patient. Patient verbalizes understanding and agreement with the discharge plan using the teachback method.      Referral for Outpatient Tobacco Cessation Counseling, upon discharge (tiffanie X if applicable and completed):    ( )  Hospital staff assisted patient to call Quit Line or faxed referral                                   during hospitalization                  (X)  Recognizing danger situations (included triggers and roadblocks), if not completed on admission                    (X)  Coping skills (new ways to manage stress, exercise, relaxation techniques, changing routine, distraction), if not completed on admission                                                           (X)  Basic information about quitting (benefits of quitting, techniques in how to quit, available resources, if not completed on admission  ( ) Referral for counseling faxed to [facility]   ( ) Patient refused referral  ( ) Patient refused counseling  ( ) Patient refused smoking cessation medication upon discharge    Vaccinations (tiffanie X if applicable and completed):  ( ) Patient states already received influenza vaccine elsewhere  ( ) Patient received influenza vaccine during this hospitalization  (X) Patient refused influenza vaccine at this time 97.6

## 2025-05-04 NOTE — PATIENT PROFILE ADULT - FALL HARM RISK - HARM RISK INTERVENTIONS

## 2025-05-04 NOTE — H&P ADULT - NSICDXPASTMEDICALHX_GEN_ALL_CORE_FT
PAST MEDICAL HISTORY:  Asthma     Diabetes     Gastritis     GERD (gastroesophageal reflux disease)     H/O fracture of wrist     Hepatic steatosis     Hypertension     Multiple pulmonary nodules     Pancreatic cyst     Proliferative diabetic retinopathy     Pseudophakia, right eye     TIA (transient ischemic attack)     Uterine fibroid

## 2025-05-04 NOTE — ED ADULT NURSE NOTE - OBJECTIVE STATEMENT
pt alert ,oriented x4.  to spot 24 a. son at bedside. pt c/o vomiting since yesterday with c/o dizziness ,back pains. iv access. awaits md evaluation. will  continue to monitor, await orders

## 2025-05-04 NOTE — H&P ADULT - HISTORY OF PRESENT ILLNESS
HPI: 70yoF PMH HTN, CVA no deficits on plavix, DM recently placed on SYnjardy (metformin/empagliflozin) 3 weeks ago, & increased on her ozempic a few days, ago, p/w multiple episodes of nbnb emesis starting 1 day ago, no abd pain, fevers, diarrhea, constipation, sick contacts. pt endorses increased thirst. son states pt does endorse some back pain. Pt denies chest pain, shortness of breath. pt appears lethargic, unwell appearing, kussmaul breathing. MICU consulted for evaluation of severe abdominal deficits and c/f euglycemic DKA/dehydration    ED course: VS: T 97.3, , /71, RR 18, SpO2 96% RA. In the ED noted to have pH 6.9, Bicarb 8. BS 300s  Treated w/ fluids and bicarb.    HPI: 70yoF PMH HTN, CVA (on plavix monotherapy), T2DM c/b mild proliferative diabetic retinopathy (non-insulin dependent) recently placed on SYnjardy (metformin/empagliflozin) 3 weeks ago, & increased on her ozempic a few days, ago, gastritis, pancreatic cyst, and multiple pulmonary nodules p/w multiple episodes of nbnb emesis that started 1 day ago. Pt's son states that he noticed she was beginning to have symptoms on Thursday. That same day, she experienced some nausea and vomited. The family contacted her PCP who notified her that some of these symptoms may be due to the dose increase of her Ozempic from 0.5-1. She has had fatigue and GI symptoms since starting Ozempic. On Friday, she felt better. Then on Saturday, she began to have recurrent bouts of emesis, nausea, and difficulties with breathing. Pt denies abd pain, fevers, SOB, diarrhea, constipation, sick contacts, chest pain. Review of systems + for polydypsia and back pain.  MICU consulted for evaluation of severe abdominal deficits and c/f  DKA/dehydration    ED course: VS: T 97.3, , /71, RR 18, SpO2 96% RA. PE there positive for lethargy and Kussmaul respirations. In the ED noted to have pH 6.9, Bicarb 8. BS 300s  Treated w/ fluids and bicarb.

## 2025-05-04 NOTE — H&P ADULT - NSHPLABSRESULTS_GEN_ALL_CORE
LABS:                          16.6   21.19 )-----------( 413      ( 04 May 2025 15:14 )             53.2     Hb Trend: 16.6<--  WBC Trend: 21.19<--  Plt Trend: 413<--          05-04    145  |  105  |  15  ----------------------------<  304[H]  4.7   |  7[LL]  |  0.71    Ca    7.4[L]      04 May 2025 19:36  Phos  6.9     05-04  Mg     2.50     05-04    TPro  8.3  /  Alb  4.7  /  TBili  0.2  /  DBili  x   /  AST  79[H]  /  ALT  20  /  AlkPhos  47  05-04        Urinalysis Basic - ( 04 May 2025 19:36 )    Color: Yellow / Appearance: Clear / S.025 / pH: x  Gluc: 304 mg/dL / Ketone: >=160 mg/dL  / Bili: Negative / Urobili: 0.2 mg/dL   Blood: x / Protein: 30 mg/dL / Nitrite: Negative   Leuk Esterase: Trace / RBC: 0 /HPF / WBC 24 /HPF   Sq Epi: x / Non Sq Epi: 2 /HPF / Bacteria: Few /HPF      CAPILLARY BLOOD GLUCOSE      POCT Blood Glucose.: 234 mg/dL (04 May 2025 18:11)  POCT Blood Glucose.: 206 mg/dL (04 May 2025 13:30)          IMAGING:    Chest Xray /: Clear lungs

## 2025-05-04 NOTE — ED ADULT NURSE REASSESSMENT NOTE - NS ED NURSE REASSESS COMMENT FT1
Mobile Critical Care RN: Pt currently A&Ox3. Can't remember birthday at this time. Daughter at bedside says baseline A&Ox4. Pt lethargic but arouses to voice. Denies pain. Maintaining SpO2 >96% on RA. CTAB. Tachypneic 20s. Afebrile. Sinus tach on cardiac monitor 120s-130s. Maintaining MAPs >65. No edema. +2 radial and DP pulses. Soft abdomen, non distended, non tender. Last BM yesterday. Bicarb gtt maintained as ordered. Fourth bolus IVF infusing as ordered. Primafit in place w/ >600cc clear yellow urine. Skin intact. B/l AC 20g in place flushing w/ +blood return. Pt transported to MICU on [facility]. Pt safety maintained.
pt medicated as ordered. placed on c monitor. labs sent. c/o back pains md aware. will continue to monitor
pt remains awake ,alert ,oriented. remins  tachypneic ,tachycardia on c monitor. 2nd piv inserted. labs sent. awaiting icu consult. will continue to monitor
pt remains awake ,alert ,oriented. respirations continue to be labored. denies pain at present. repeat labs sent.  bicarb infusion  infusing as ordered at 250 cc/hr. will continue to monitor. ua,urine culture sent
report received from RN Stefani, pt resting in stretcher, RR noted to be mildly labored, spo2 100% on RA. remains on cardiac monitor noted to be sinus tachycardiac, . awaiting repeat lab results. pending UA. safety maintained.

## 2025-05-04 NOTE — ED PROVIDER NOTE - CLINICAL SUMMARY MEDICAL DECISION MAKING FREE TEXT BOX
70yoF PMH DM, recently increased on her ozempic a few days, ago, p/w multiple episodes of nbnb emesis starting 2 days ago, no abd pain, fevers, diarrhea, constipation, sick contacts. son states pt does endorse some back pain. Pt denies chest pain, shortness of breath. pt appears lethargic, unwell appearing, kussmaul breathing.     suspect possible pancreatitis, sepsis, dehydration, electrolyte abnormalities  euglycemic DKA  will obtain labs, vbg, cxr, give antiemetics, fluids, MICU consult  pH 6.9, bicarb 8, Tco2 70yoF PMH HTN, CVA no deficits on plavix, DM recently placed on SYnjardy (metformin/empagliflozin) 3 weeks ago, & increased on her ozempic a few days, ago, p/w multiple episodes of nbnb emesis starting 1 day ago, no abd pain, fevers, diarrhea, constipation, sick contacts. pt endorses increased thirst. son states pt does endorse some back pain. Pt denies chest pain, shortness of breath. pt appears lethargic, unwell appearing, kussmaul breathing    suspect possible pancreatitis, sepsis, dehydration, electrolyte abnormalities  euglycemic DKA  will obtain labs, vbg, cxr, give antiemetics, fluids, MICU consult  pH 6.9, bicarb 8, Tco2

## 2025-05-04 NOTE — ED ADULT TRIAGE NOTE - WEIGHT METHOD
----- Message from Deja Kumari DO sent at 2/16/2018 10:37 AM CST -----  Patient was found to have a viable intrauterine pregnancy. Can we therefore schedule her for an OB 1 with me in the near future? Thank you        ----- Message -----  From: Morales Nolan MD  Sent: 2/15/2018  11:18 AM  To: [facility] Result Pool         stated

## 2025-05-04 NOTE — H&P ADULT - ATTENDING COMMENTS
70yoF PMH HTN, CVA no deficits on plavix, DM recently placed on SYnjardy (metformin/empagliflozin) 3 weeks ago, and recent increase in her ozempic dose, p/w multiple episodes of emesis and decreased PO intake, admitted to MICU with concern c/w euglycemic diabetic ketoacidosis.   s/p 4 liters of NS in the ED, and bicarb gtt started at 250cc/hour  Insulin gtt, FS q1h, D5LR, BMP with mag and phos and VBG q4h  aggressive electrolyte replacement   pancx, RVP, will monitor off Abx for now  h/o CVA on plavix  DVT ppx lovenox  Full code

## 2025-05-04 NOTE — ED PROVIDER NOTE - CARE PLAN
Principal Discharge DX:	Nausea & vomiting   1 Principal Discharge DX:	Metabolic acidosis with respiratory acidosis

## 2025-05-04 NOTE — ED ADULT TRIAGE NOTE - CHIEF COMPLAINT QUOTE
brought in by EMS from home for dizziness, N/V.  Pt states sudden onset of dizziness with nausea vomiting that started 2pm yesterday. Hx DM2, HTN, HLD, asthma. Fingerstick 119. Dr. Brown came to triage to eval pt. No code stroke called at this time

## 2025-05-04 NOTE — H&P ADULT - NSHPPHYSICALEXAM_GEN_ALL_CORE
LOS:     VITALS:   T(C): 36.4 (05-04-25 @ 17:01), Max: 36.4 (05-04-25 @ 17:01)  HR: 122 (05-04-25 @ 20:20) (122 - 131)  BP: 129/74 (05-04-25 @ 20:20) (111/71 - 132/44)  RR: 28 (05-04-25 @ 20:20) (18 - 30)  SpO2: 99% (05-04-25 @ 20:20) (96% - 100%)    GENERAL: NAD, lying in bed comfortably  HEAD:  Atraumatic, Normocephalic  EYES: EOMI, PERRLA, conjunctiva and sclera clear  ENT: Moist mucous membranes  NECK: Supple, No JVD  CHEST/LUNG: Clear to auscultation bilaterally; No rales, rhonchi, wheezing, or rubs. Unlabored respirations  HEART: Regular rate and rhythm; No murmurs, rubs, or gallops  ABDOMEN: BSx4; Soft, nontender, nondistended  EXTREMITIES:  2+ Peripheral Pulses, brisk capillary refill. No clubbing, cyanosis, or edema  NERVOUS SYSTEM:  A&Ox3, no focal deficits   SKIN: No rashes or lesions LOS:     VITALS:   T(C): 36.4 (05-04-25 @ 17:01), Max: 36.4 (05-04-25 @ 17:01)  HR: 122 (05-04-25 @ 20:20) (122 - 131)  BP: 129/74 (05-04-25 @ 20:20) (111/71 - 132/44)  RR: 28 (05-04-25 @ 20:20) (18 - 30)  SpO2: 99% (05-04-25 @ 20:20) (96% - 100%)    GENERAL: NAD, lying in bed comfortably  HEAD:  Atraumatic, Normocephalic  EYES: EOMI, PERRLA, conjunctiva and sclera clear  ENT: Dry mucous membranes   NECK: Supple, No JVD  CHEST/LUNG: Clear to auscultation bilaterally; No rales, rhonchi, wheezing, or rubs.   HEART: Tachycardic; No murmurs, rubs, or gallops  ABDOMEN: BSx4; Soft, nontender, nondistended. No rigidity or guarding.  EXTREMITIES:  2+ Peripheral Pulses, brisk capillary refill. No clubbing, cyanosis, or edema  NERVOUS SYSTEM:  A&Ox3, no focal deficits   SKIN: No rashes or lesions

## 2025-05-04 NOTE — H&P ADULT - ASSESSMENT
70yoF PMH HTN, CVA no deficits on plavix, DM recently placed on SYnjardy (metformin/empagliflozin) 3 weeks ago, & increased on her ozempic a few days, ago, p/w multiple episodes of nbnb emesis, Kussmaul breathing, c/w diabetic ketoacidosis.     Neurology   A&Ox3     Cardiology     Respiratory   #Multiple pulmonary nodules (finding on CT March 2024), bronchiectasis  -Follow up outpatient pulm in 6 months and outpatient CT    +Kussmaul breathing    Renal   HAGMA; pH venous 6.97 >>7.16,     Gastroenterology  +Abdominal pain      Endocrine  #Euglycemic DKA glucose [id number]  Glucose >1000 in urine, ketonuria, mild protein proteinuria, pyuria, trace bacteria      Complicated type 2 diabetes with ocular complications, non-insulin dependent  Mild non-proliferative diabetic retinopathy OD    Infectious Diseases     Hematology/Oncology   Leukocytosis WBC 21.19, Thrombocytosis, Erythrocytosis suspect iso dehydration vs possible infection       Prophylactic     70yoF PMH HTN, CVA no deficits on plavix, DM recently placed on SYnjardy (metformin/empagliflozin) 3 weeks ago, & increased on her ozempic a few days, ago, p/w multiple episodes of nbnb emesis, Kussmaul breathing, c/w diabetic ketoacidosis.     Neurology   A&Ox3     Cardiology     Respiratory   #Multiple pulmonary nodules (finding on CT March 2024), bronchiectasis  -Follow up outpatient pulm in 6 months and outpatient CT    +Kussmaul breathing    Renal   HAGMA; pH venous 6.97 >>7.16,     Gastroenterology  +Abdominal pain      Endocrine  #Diabetic ketoacidosis  -Etiology: Possibly iso of infection d/t trace bacteria U/A  Labs: Glucose >1000 in urine, ketonuria, mild protein proteinuria, pyuria, trace bacteria      Complicated type 2 diabetes with ocular complications, non-insulin dependent  Mild non-proliferative diabetic retinopathy OD    Infectious Diseases     Hematology/Oncology   Leukocytosis WBC 21.19, Thrombocytosis, Erythrocytosis suspect iso dehydration vs possible infection       Prophylactic     70yoF PMH HTN, CVA no deficits on plavix, DM recently placed on SYnjardy (metformin/empagliflozin) 3 weeks ago, & increased on her ozempic a few days, ago, p/w multiple episodes of nbnb emesis, Kussmaul breathing, c/w diabetic ketoacidosis.     Neurology   A&Ox3  No active issues    -q1 neuro checks given acidemia    Cardiology   #Tachycardia (Likely iso dehydration given unimpressive U/A w/ trace bacteria); sinus on telemetry; RRR No murmurs, rubs, gallops on PE  -12 Lead EKG to evaluate for arrhythmia    -Continuous telemetry    Respiratory  #Kussmaul respirations iso DKA  -Continuous pulse oximetry and q2 VBGs to evaluate acidemia and monitor improvement     #Hx of multiple pulmonary nodules (finding on CT March 2024), bronchiectasis  -Follow up outpatient pulm in 6 months and outpatient CT for disease surveillance and to monitor progression =    Endocrine   #Diabetic ketoacidosis (Euglycemic initially)  #T2DM c/b mild diabetic retinopathy, non-insulin dependent  -Etiology: Most likely drug induced from SGLT-2 and/or change in ozempic,  ddx: UTI though U/A not too impressive  Labs: Glucose >1000 in urine, ketonuria, mild protein proteinuria, pyuria, trace bacteria  -Insulin gtt  @ rate of 5.4U/hr (0.1 ml/kg); adjust based on BG levels   -Maintenance fluids at 100 ml/hr; transition to D5 1/2NS once BG <300     -Glucose fingersticks q1   -Trend BHB, BMP, and VBGs q2   -Maintain NPO until gap closes and patient transitioned  -Replete K+   -F/u urine cx    Renal   High Anion Gap Metabolic Acidosis 2/2 DKA  -Labs: pH venous 6.97, BHB >9.0, bicarb 7, AG >38 in ED. S/p HCO3- infusion for acidemia <7.10 and 4L volume repletion w/ NS  -Trend BMP, BHB, VBGs q2 initially to monitor acidemia   -Monitor urine output   -Rest of plan as documented in endocrine section     Gastroenterology  #Hx of gastritis   -Patient has a self-reported Tylenol allergy; takes ibuprofen for pain; found to have gastritis on EGD   -Resume pantoprazole once dose verified   -Limit NSAID use     #Hx of Pancreatic Cyst   -F/u as an outpatient     Infectious Diseases   #Leukocytosis   -possibly iso dehydration d/t DKA vs stress vs possible infection (unlikely)  Labs: WBC 21.19, U/A w/ trace bacteria and small LE cannot definitely exclude infection  -F/u results of urine cx    Hematology/Oncology   #Leukocytosis, Thrombocytosis, Erythrocytosis suspect iso dehydration  -CTM     70yoF PMH HTN, CVA no deficits on plavix, DM recently placed on SYnjardy (metformin/empagliflozin) 3 weeks ago, & increased on her ozempic a few days, ago, p/w multiple episodes of nbnb emesis, Kussmaul breathing, c/w diabetic ketoacidosis.     Neurology   A&Ox3  No active issues    -q4 neuro checks given acidemia    Cardiology   #Tachycardia (Likely iso dehydration given unimpressive U/A w/ trace bacteria); sinus on telemetry; RRR No murmurs, rubs, gallops on PE  -12 Lead EKG to evaluate for arrhythmia    -Continuous telemetry    Respiratory  #Kussmaul respirations iso DKA  -Continuous pulse oximetry and q4 VBGs to evaluate acidemia and monitor improvement     #Hx of multiple pulmonary nodules (finding on CT March 2024), bronchiectasis  -Follow up outpatient pulm in 6 months and outpatient CT for disease surveillance and to monitor progression =    Endocrine   #Diabetic ketoacidosis (Euglycemic initially)  #T2DM c/b mild diabetic retinopathy, non-insulin dependent  -Etiology: Most likely drug induced from SGLT-2 and/or change in ozempic,  ddx: UTI though U/A not too impressive  Labs: Glucose >1000 in urine, ketonuria, mild protein proteinuria, pyuria, trace bacteria  -Insulin gtt  @ rate of 5.4U/hr (0.1 ml/kg); adjust based on BG levels   -D5LR @ rate of 150 ml/hr  -Glucose fingersticks q1   -Trend BHB, BMP, and VBGs q4  -Maintain NPO until gap closes and patient transitioned  -Replete K+ w/ 20 mEq intermittent infusions  -F/u urine cx    Renal   High Anion Gap Metabolic Acidosis 2/2 DKA  -Labs: pH venous 6.97, BHB >9.0, bicarb 7, AG >38 in ED. S/p HCO3- infusion for acidemia <7.10 and 4L volume repletion w/ NS  -Trend BMP, BHB, VBGs q4 initially to monitor acidemia   -Monitor urine output   -Rest of plan as documented in endocrine section     Gastroenterology  #Hx of gastritis   -Patient has a self-reported Tylenol allergy; takes ibuprofen for pain; found to have gastritis on EGD   -Resume pantoprazole once dose verified   -Limit NSAID use     #Hx of Pancreatic Cyst   -F/u as an outpatient     Infectious Diseases   #Leukocytosis   -possibly iso dehydration d/t DKA vs stress vs possible infection (unlikely)  Labs: WBC 21.19, U/A w/ trace bacteria and small LE   -F/u results of urine cx    Hematology/Oncology   #Leukocytosis, Thrombocytosis, Erythrocytosis suspect iso dehydration  -CTM     DVT prophylaxis  Lovenox 40 mg qd  70yoF PMH HTN, CVA no deficits on plavix, DM recently placed on SYnjardy (metformin/empagliflozin) 3 weeks ago, & increased on her ozempic a few days, ago, p/w multiple episodes of nbnb emesis, Kussmaul breathing, c/w diabetic ketoacidosis.     Neurology   A&Ox3  #Hx of CVA  -Resume plavix     #C/f metabolic encephalopathy given DKA  -q4 neuro checks     Cardiology   #Tachycardia (Likely iso dehydration given unimpressive U/A w/ trace bacteria); sinus on telemetry; RRR No murmurs, rubs, gallops on PE  -12 Lead EKG to evaluate for arrhythmia    -Continuous telemetry    Respiratory  #Kussmaul respirations iso DKA  -Continuous pulse oximetry and q4 VBGs to evaluate acidemia and monitor improvement     #Hx of multiple pulmonary nodules (finding on CT March 2024), bronchiectasis  -Follow up outpatient pulm in 6 months and outpatient CT for disease surveillance and to monitor progression =    Endocrine   #Diabetic ketoacidosis (Euglycemic initially)  #T2DM c/b mild diabetic retinopathy, non-insulin dependent  -Etiology: Most likely drug induced from SGLT-2 and/or change in ozempic,  ddx: UTI though U/A not too impressive  Labs: Glucose >1000 in urine, ketonuria, mild protein proteinuria, pyuria, trace bacteria  -Insulin gtt  @ rate of 5.4U/hr (0.1 ml/kg); adjust based on BG levels   -D5LR @ rate of 150 ml/hr  -Glucose fingersticks q1   -Trend BHB, BMP, and VBGs q4  -Maintain NPO until gap closes and patient transitioned  -Replete K+ w/ 20 mEq intermittent infusions  -F/u urine cx    Renal   High Anion Gap Metabolic Acidosis 2/2 DKA  -Labs: pH venous 6.97, BHB >9.0, bicarb 7, AG >38 in ED. S/p HCO3- infusion for acidemia <7.10 and 4L volume repletion w/ NS  -Trend BMP, BHB, VBGs q4 initially to monitor acidemia   -Monitor urine output   -Rest of plan as documented in endocrine section     Gastroenterology  #Hx of gastritis   -Patient has a self-reported Tylenol allergy; takes ibuprofen for pain; found to have gastritis on EGD   -Resume pantoprazole once dose verified   -Limit NSAID use     #Hx of Pancreatic Cyst   -F/u as an outpatient     Infectious Diseases   #Leukocytosis   -possibly iso dehydration d/t DKA vs stress vs possible infection (unlikely)  Labs: WBC 21.19, U/A w/ trace bacteria and small LE   -F/u results of urine cx    Hematology/Oncology   #Leukocytosis, Thrombocytosis, Erythrocytosis suspect iso dehydration  -CTM     DVT prophylaxis  Lovenox 40 mg qd

## 2025-05-04 NOTE — ED PROVIDER NOTE - PROGRESS NOTE DETAILS
pt reassessed post fluids, appears less lethargic, more responsive, HR downtrending, less tachypnea. case d/w MICU recommending Bicarb boluses x 2, and drip.

## 2025-05-04 NOTE — ED ADULT NURSE NOTE - NSFALLHARMRISKINTERV_ED_ALL_ED
Assistance OOB with selected safe patient handling equipment if applicable/Communicate risk of Fall with Harm to all staff, patient, and family/Provide visual cue: red socks, yellow wristband, yellow gown, etc/Reinforce activity limits and safety measures with patient and family/Bed in lowest position, wheels locked, appropriate side rails in place/Call bell, personal items and telephone in reach/Instruct patient to call for assistance before getting out of bed/chair/stretcher/Non-slip footwear applied when patient is off stretcher/[place] to call system/Physically safe environment - no spills, clutter or unnecessary equipment/Purposeful Proactive Rounding/Room/bathroom lighting operational, light cord in reach

## 2025-05-04 NOTE — ED PROVIDER NOTE - CRITICAL CARE ATTENDING CONTRIBUTION TO CARE
Attending note:   After face to face evaluation of this patient, I concur with above noted hx, pe, and care plan for this patient. Delgadillo: 70-year-old female with type 2 diabetes on Ozempic.  Patient also has a past medical history of GERD and CVA with no residual deficits.  Patient is still taking Plavix.  Patient brought in with son at bedside.  Patient upped her dose of Ozempic 3 days ago from 0.5 mg to 1 mg.  Since yesterday patient woke up this morning with severe nausea and vomiting over 12 episodes.  When speaking with patient she is difficult to talk to us she appears unwell and lethargic with Kussmaul's breathing.  She denies chest pain, shortness of breath, headache, abdominal pain.  However she does state that she feels nauseous.  No active vomiting noted at this time.  Patient is afebrile rectally.  Blood pressure stable but patient is significantly tachycardic.  Respiratory rate is elevated but oxygenating well on room air.  Lungs are clear bilaterally and heart is regular without any significant murmur.  Abdomen is very soft with bowel sounds and no areas of tenderness.  There is no midline spinal tenderness or skin lesions noted.  There is no CVA tenderness.  There is no pitting edema patient is able to move all extremities well.  Patient is able to follow most commands but only after repeated questioning and this is due to patient's lethargy.  Patient's blood sugar is notably elevated in the 300s.  Initial labs show severe acidosis, treated with fluids and bicarb, MICU called for evaluation for severe abdomen deficits.  Awaiting rest of labs.  This may be due to severe dehydration after increasing Ozempic dose.  Questionable metformin use.  Patient to monitor very closely on cardiac monitor.

## 2025-05-04 NOTE — ED PROVIDER NOTE - PHYSICAL EXAMINATION
CONSTITUTIONAL: marked distress, lethargic but arousable verbally  HEAD: Normocephalic, atraumatic;  NECK/LYMPH: Supple; non-tender;  CARD: Normal S1, S2; no murmurs, rubs, or gallops noted  RESP: Tachypneic; breath sounds clear and equal bilaterally; no wheezes, rhonchi, or rales noted  ABD/GI: soft, non-distended; non-tender; no palpable organomegaly, no pulsatile mass  EXT/MS: moves all extremities; distal pulses are normal, no pedal edema  SKIN: Normal for age and race; warm; dry; good turgor; no apparent lesions or exudate noted  NEURO: Awake, alert, oriented x 3, no gross deficits, CN II-XII grossly intact, no motor or sensory deficit noted  PSYCH: Normal mood; appropriate affect

## 2025-05-04 NOTE — PATIENT PROFILE ADULT - HAVE YOU BEEN EATING POORLY BECAUSE OF A DECREASED APPETITE?
Detail Level: Detailed Depth Of Biopsy: dermis Was A Bandage Applied: Yes Size Of Lesion In Cm: 0 Biopsy Type: H and E Biopsy Method: Dermablade Anesthesia Type: 1% lidocaine with epinephrine Anesthesia Volume In Cc: 0.5 Hemostasis: Aluminum Chloride and Electrocautery Wound Care: Vaseline Dressing: bandage Destruction After The Procedure: No Type Of Destruction Used: Curettage Lab: 6 Lab Facility: 3 Billing Type: Third-Party Bill Information: Selecting Yes will display possible errors in your note based on the variables you have selected. This validation is only offered as a suggestion for you. PLEASE NOTE THAT THE VALIDATION TEXT WILL BE REMOVED WHEN YOU FINALIZE YOUR NOTE. IF YOU WANT TO FAX A PRELIMINARY NOTE YOU WILL NEED TO TOGGLE THIS TO 'NO' IF YOU DO NOT WANT IT IN YOUR FAXED NOTE. Yes (1)

## 2025-05-05 LAB
ANION GAP SERPL CALC-SCNC: 12 MMOL/L — SIGNIFICANT CHANGE UP (ref 7–14)
ANION GAP SERPL CALC-SCNC: 12 MMOL/L — SIGNIFICANT CHANGE UP (ref 7–14)
ANION GAP SERPL CALC-SCNC: 13 MMOL/L — SIGNIFICANT CHANGE UP (ref 7–14)
ANION GAP SERPL CALC-SCNC: 16 MMOL/L — HIGH (ref 7–14)
ANION GAP SERPL CALC-SCNC: 18 MMOL/L — HIGH (ref 7–14)
ANION GAP SERPL CALC-SCNC: 22 MMOL/L — HIGH (ref 7–14)
ANION GAP SERPL CALC-SCNC: >24 MMOL/L — HIGH (ref 7–14)
APTT BLD: 23.5 SEC — LOW (ref 26.1–36.8)
APTT BLD: 34.3 SEC — SIGNIFICANT CHANGE UP (ref 26.1–36.8)
B-OH-BUTYR SERPL-SCNC: 1.7 MMOL/L — HIGH (ref 0–0.4)
B-OH-BUTYR SERPL-SCNC: 1.9 MMOL/L — HIGH (ref 0–0.4)
B-OH-BUTYR SERPL-SCNC: 2.5 MMOL/L — HIGH (ref 0–0.4)
B-OH-BUTYR SERPL-SCNC: 3.2 MMOL/L — HIGH (ref 0–0.4)
B-OH-BUTYR SERPL-SCNC: 5.2 MMOL/L — HIGH (ref 0–0.4)
B-OH-BUTYR SERPL-SCNC: 7.5 MMOL/L — HIGH (ref 0–0.4)
BASOPHILS # BLD AUTO: 0.02 K/UL — SIGNIFICANT CHANGE UP (ref 0–0.2)
BASOPHILS # BLD AUTO: 0.02 K/UL — SIGNIFICANT CHANGE UP (ref 0–0.2)
BASOPHILS NFR BLD AUTO: 0.1 % — SIGNIFICANT CHANGE UP (ref 0–2)
BASOPHILS NFR BLD AUTO: 0.1 % — SIGNIFICANT CHANGE UP (ref 0–2)
BLD GP AB SCN SERPL QL: NEGATIVE — SIGNIFICANT CHANGE UP
BUN SERPL-MCNC: 10 MG/DL — SIGNIFICANT CHANGE UP (ref 7–23)
BUN SERPL-MCNC: 3 MG/DL — LOW (ref 7–23)
BUN SERPL-MCNC: 3 MG/DL — LOW (ref 7–23)
BUN SERPL-MCNC: 4 MG/DL — LOW (ref 7–23)
BUN SERPL-MCNC: 4 MG/DL — LOW (ref 7–23)
BUN SERPL-MCNC: 6 MG/DL — LOW (ref 7–23)
BUN SERPL-MCNC: 8 MG/DL — SIGNIFICANT CHANGE UP (ref 7–23)
CA-I BLD-SCNC: 0.87 MMOL/L — LOW (ref 1.15–1.29)
CALCIUM SERPL-MCNC: 6.6 MG/DL — LOW (ref 8.4–10.5)
CALCIUM SERPL-MCNC: 7.2 MG/DL — LOW (ref 8.4–10.5)
CALCIUM SERPL-MCNC: 7.6 MG/DL — LOW (ref 8.4–10.5)
CALCIUM SERPL-MCNC: 7.6 MG/DL — LOW (ref 8.4–10.5)
CALCIUM SERPL-MCNC: 7.7 MG/DL — LOW (ref 8.4–10.5)
CHLORIDE SERPL-SCNC: 108 MMOL/L — HIGH (ref 98–107)
CHLORIDE SERPL-SCNC: 108 MMOL/L — HIGH (ref 98–107)
CHLORIDE SERPL-SCNC: 109 MMOL/L — HIGH (ref 98–107)
CHLORIDE SERPL-SCNC: 109 MMOL/L — HIGH (ref 98–107)
CHLORIDE SERPL-SCNC: 111 MMOL/L — HIGH (ref 98–107)
CHLORIDE SERPL-SCNC: 111 MMOL/L — HIGH (ref 98–107)
CHLORIDE SERPL-SCNC: 112 MMOL/L — HIGH (ref 98–107)
CO2 SERPL-SCNC: 12 MMOL/L — LOW (ref 22–31)
CO2 SERPL-SCNC: 13 MMOL/L — LOW (ref 22–31)
CO2 SERPL-SCNC: 16 MMOL/L — LOW (ref 22–31)
CO2 SERPL-SCNC: 8 MMOL/L — CRITICAL LOW (ref 22–31)
CO2 SERPL-SCNC: <7 MMOL/L — CRITICAL LOW (ref 22–31)
CREAT SERPL-MCNC: 0.47 MG/DL — LOW (ref 0.5–1.3)
CREAT SERPL-MCNC: 0.48 MG/DL — LOW (ref 0.5–1.3)
CREAT SERPL-MCNC: 0.53 MG/DL — SIGNIFICANT CHANGE UP (ref 0.5–1.3)
CREAT SERPL-MCNC: 0.55 MG/DL — SIGNIFICANT CHANGE UP (ref 0.5–1.3)
CREAT SERPL-MCNC: 0.63 MG/DL — SIGNIFICANT CHANGE UP (ref 0.5–1.3)
CREAT SERPL-MCNC: 0.66 MG/DL — SIGNIFICANT CHANGE UP (ref 0.5–1.3)
CULTURE RESULTS: SIGNIFICANT CHANGE UP
EGFR: 101 ML/MIN/1.73M2 — SIGNIFICANT CHANGE UP
EGFR: 101 ML/MIN/1.73M2 — SIGNIFICANT CHANGE UP
EGFR: 102 ML/MIN/1.73M2 — SIGNIFICANT CHANGE UP
EGFR: 102 ML/MIN/1.73M2 — SIGNIFICANT CHANGE UP
EGFR: 94 ML/MIN/1.73M2 — SIGNIFICANT CHANGE UP
EGFR: 94 ML/MIN/1.73M2 — SIGNIFICANT CHANGE UP
EGFR: 95 ML/MIN/1.73M2 — SIGNIFICANT CHANGE UP
EGFR: 95 ML/MIN/1.73M2 — SIGNIFICANT CHANGE UP
EGFR: 98 ML/MIN/1.73M2 — SIGNIFICANT CHANGE UP
EGFR: 98 ML/MIN/1.73M2 — SIGNIFICANT CHANGE UP
EGFR: 99 ML/MIN/1.73M2 — SIGNIFICANT CHANGE UP
EGFR: 99 ML/MIN/1.73M2 — SIGNIFICANT CHANGE UP
EOSINOPHIL # BLD AUTO: 0 K/UL — SIGNIFICANT CHANGE UP (ref 0–0.5)
EOSINOPHIL # BLD AUTO: 0.01 K/UL — SIGNIFICANT CHANGE UP (ref 0–0.5)
EOSINOPHIL NFR BLD AUTO: 0 % — SIGNIFICANT CHANGE UP (ref 0–6)
EOSINOPHIL NFR BLD AUTO: 0.1 % — SIGNIFICANT CHANGE UP (ref 0–6)
GAS PNL BLDA: SIGNIFICANT CHANGE UP
GAS PNL BLDV: SIGNIFICANT CHANGE UP
GAS PNL BLDV: SIGNIFICANT CHANGE UP
GLUCOSE BLDC GLUCOMTR-MCNC: 109 MG/DL — HIGH (ref 70–99)
GLUCOSE BLDC GLUCOMTR-MCNC: 117 MG/DL — HIGH (ref 70–99)
GLUCOSE BLDC GLUCOMTR-MCNC: 119 MG/DL — HIGH (ref 70–99)
GLUCOSE BLDC GLUCOMTR-MCNC: 121 MG/DL — HIGH (ref 70–99)
GLUCOSE BLDC GLUCOMTR-MCNC: 135 MG/DL — HIGH (ref 70–99)
GLUCOSE BLDC GLUCOMTR-MCNC: 137 MG/DL — HIGH (ref 70–99)
GLUCOSE BLDC GLUCOMTR-MCNC: 137 MG/DL — HIGH (ref 70–99)
GLUCOSE BLDC GLUCOMTR-MCNC: 144 MG/DL — HIGH (ref 70–99)
GLUCOSE BLDC GLUCOMTR-MCNC: 144 MG/DL — HIGH (ref 70–99)
GLUCOSE BLDC GLUCOMTR-MCNC: 147 MG/DL — HIGH (ref 70–99)
GLUCOSE BLDC GLUCOMTR-MCNC: 153 MG/DL — HIGH (ref 70–99)
GLUCOSE BLDC GLUCOMTR-MCNC: 156 MG/DL — HIGH (ref 70–99)
GLUCOSE BLDC GLUCOMTR-MCNC: 157 MG/DL — HIGH (ref 70–99)
GLUCOSE BLDC GLUCOMTR-MCNC: 158 MG/DL — HIGH (ref 70–99)
GLUCOSE BLDC GLUCOMTR-MCNC: 159 MG/DL — HIGH (ref 70–99)
GLUCOSE BLDC GLUCOMTR-MCNC: 162 MG/DL — HIGH (ref 70–99)
GLUCOSE BLDC GLUCOMTR-MCNC: 164 MG/DL — HIGH (ref 70–99)
GLUCOSE BLDC GLUCOMTR-MCNC: 165 MG/DL — HIGH (ref 70–99)
GLUCOSE BLDC GLUCOMTR-MCNC: 168 MG/DL — HIGH (ref 70–99)
GLUCOSE BLDC GLUCOMTR-MCNC: 172 MG/DL — HIGH (ref 70–99)
GLUCOSE BLDC GLUCOMTR-MCNC: 181 MG/DL — HIGH (ref 70–99)
GLUCOSE BLDC GLUCOMTR-MCNC: 184 MG/DL — HIGH (ref 70–99)
GLUCOSE BLDC GLUCOMTR-MCNC: 190 MG/DL — HIGH (ref 70–99)
GLUCOSE BLDC GLUCOMTR-MCNC: 202 MG/DL — HIGH (ref 70–99)
GLUCOSE BLDC GLUCOMTR-MCNC: 229 MG/DL — HIGH (ref 70–99)
GLUCOSE SERPL-MCNC: 122 MG/DL — HIGH (ref 70–99)
GLUCOSE SERPL-MCNC: 152 MG/DL — HIGH (ref 70–99)
GLUCOSE SERPL-MCNC: 152 MG/DL — HIGH (ref 70–99)
GLUCOSE SERPL-MCNC: 165 MG/DL — HIGH (ref 70–99)
GLUCOSE SERPL-MCNC: 183 MG/DL — HIGH (ref 70–99)
GLUCOSE SERPL-MCNC: 186 MG/DL — HIGH (ref 70–99)
HCT VFR BLD CALC: 35.8 % — SIGNIFICANT CHANGE UP (ref 34.5–45)
HCT VFR BLD CALC: 41.2 % — SIGNIFICANT CHANGE UP (ref 34.5–45)
HGB BLD-MCNC: 11.8 G/DL — SIGNIFICANT CHANGE UP (ref 11.5–15.5)
HGB BLD-MCNC: 13.7 G/DL — SIGNIFICANT CHANGE UP (ref 11.5–15.5)
IANC: 11.08 K/UL — HIGH (ref 1.8–7.4)
IANC: 12.55 K/UL — HIGH (ref 1.8–7.4)
IMM GRANULOCYTES NFR BLD AUTO: 0.4 % — SIGNIFICANT CHANGE UP (ref 0–0.9)
IMM GRANULOCYTES NFR BLD AUTO: 0.8 % — SIGNIFICANT CHANGE UP (ref 0–0.9)
INR BLD: 0.9 RATIO — SIGNIFICANT CHANGE UP (ref 0.85–1.16)
INR BLD: <0.9 RATIO — SIGNIFICANT CHANGE UP (ref 0.85–1.16)
LACTATE BLDV-MCNC: 1.4 MMOL/L — SIGNIFICANT CHANGE UP (ref 0.5–2)
LACTATE BLDV-MCNC: 1.8 MMOL/L — SIGNIFICANT CHANGE UP (ref 0.5–2)
LYMPHOCYTES # BLD AUTO: 1.65 K/UL — SIGNIFICANT CHANGE UP (ref 1–3.3)
LYMPHOCYTES # BLD AUTO: 11.8 % — LOW (ref 13–44)
LYMPHOCYTES # BLD AUTO: 13.7 % — SIGNIFICANT CHANGE UP (ref 13–44)
LYMPHOCYTES # BLD AUTO: 2.22 K/UL — SIGNIFICANT CHANGE UP (ref 1–3.3)
MAGNESIUM SERPL-MCNC: 1.4 MG/DL — LOW (ref 1.6–2.6)
MAGNESIUM SERPL-MCNC: 1.7 MG/DL — SIGNIFICANT CHANGE UP (ref 1.6–2.6)
MAGNESIUM SERPL-MCNC: 1.8 MG/DL — SIGNIFICANT CHANGE UP (ref 1.6–2.6)
MAGNESIUM SERPL-MCNC: 2.1 MG/DL — SIGNIFICANT CHANGE UP (ref 1.6–2.6)
MAGNESIUM SERPL-MCNC: 2.2 MG/DL — SIGNIFICANT CHANGE UP (ref 1.6–2.6)
MAGNESIUM SERPL-MCNC: 2.2 MG/DL — SIGNIFICANT CHANGE UP (ref 1.6–2.6)
MAGNESIUM SERPL-MCNC: 2.4 MG/DL — SIGNIFICANT CHANGE UP (ref 1.6–2.6)
MCHC RBC-ENTMCNC: 29 PG — SIGNIFICANT CHANGE UP (ref 27–34)
MCHC RBC-ENTMCNC: 29.7 PG — SIGNIFICANT CHANGE UP (ref 27–34)
MCHC RBC-ENTMCNC: 33 G/DL — SIGNIFICANT CHANGE UP (ref 32–36)
MCHC RBC-ENTMCNC: 33.3 G/DL — SIGNIFICANT CHANGE UP (ref 32–36)
MCV RBC AUTO: 88 FL — SIGNIFICANT CHANGE UP (ref 80–100)
MCV RBC AUTO: 89.2 FL — SIGNIFICANT CHANGE UP (ref 80–100)
MONOCYTES # BLD AUTO: 1.15 K/UL — HIGH (ref 0–0.9)
MONOCYTES # BLD AUTO: 1.32 K/UL — HIGH (ref 0–0.9)
MONOCYTES NFR BLD AUTO: 8.1 % — SIGNIFICANT CHANGE UP (ref 2–14)
MONOCYTES NFR BLD AUTO: 8.2 % — SIGNIFICANT CHANGE UP (ref 2–14)
MRSA PCR RESULT.: DETECTED
NEUTROPHILS # BLD AUTO: 11.08 K/UL — HIGH (ref 1.8–7.4)
NEUTROPHILS # BLD AUTO: 12.55 K/UL — HIGH (ref 1.8–7.4)
NEUTROPHILS NFR BLD AUTO: 77.3 % — HIGH (ref 43–77)
NEUTROPHILS NFR BLD AUTO: 79.4 % — HIGH (ref 43–77)
NRBC # BLD AUTO: 0 K/UL — SIGNIFICANT CHANGE UP (ref 0–0)
NRBC # BLD AUTO: 0 K/UL — SIGNIFICANT CHANGE UP (ref 0–0)
NRBC # FLD: 0 K/UL — SIGNIFICANT CHANGE UP (ref 0–0)
NRBC # FLD: 0 K/UL — SIGNIFICANT CHANGE UP (ref 0–0)
NRBC BLD AUTO-RTO: 0 /100 WBCS — SIGNIFICANT CHANGE UP (ref 0–0)
NRBC BLD AUTO-RTO: 0 /100 WBCS — SIGNIFICANT CHANGE UP (ref 0–0)
PHOSPHATE SERPL-MCNC: 0.6 MG/DL — CRITICAL LOW (ref 2.5–4.5)
PHOSPHATE SERPL-MCNC: 1.1 MG/DL — LOW (ref 2.5–4.5)
PHOSPHATE SERPL-MCNC: 1.7 MG/DL — LOW (ref 2.5–4.5)
PHOSPHATE SERPL-MCNC: 1.8 MG/DL — LOW (ref 2.5–4.5)
PHOSPHATE SERPL-MCNC: 2.5 MG/DL — SIGNIFICANT CHANGE UP (ref 2.5–4.5)
PHOSPHATE SERPL-MCNC: 2.6 MG/DL — SIGNIFICANT CHANGE UP (ref 2.5–4.5)
PLATELET # BLD AUTO: 272 K/UL — SIGNIFICANT CHANGE UP (ref 150–400)
PLATELET # BLD AUTO: 277 K/UL — SIGNIFICANT CHANGE UP (ref 150–400)
POTASSIUM SERPL-MCNC: 2.7 MMOL/L — CRITICAL LOW (ref 3.5–5.3)
POTASSIUM SERPL-MCNC: 3.8 MMOL/L — SIGNIFICANT CHANGE UP (ref 3.5–5.3)
POTASSIUM SERPL-MCNC: 3.9 MMOL/L — SIGNIFICANT CHANGE UP (ref 3.5–5.3)
POTASSIUM SERPL-MCNC: 4.3 MMOL/L — SIGNIFICANT CHANGE UP (ref 3.5–5.3)
POTASSIUM SERPL-MCNC: 5.9 MMOL/L — HIGH (ref 3.5–5.3)
POTASSIUM SERPL-SCNC: 2.7 MMOL/L — CRITICAL LOW (ref 3.5–5.3)
POTASSIUM SERPL-SCNC: 3.8 MMOL/L — SIGNIFICANT CHANGE UP (ref 3.5–5.3)
POTASSIUM SERPL-SCNC: 3.9 MMOL/L — SIGNIFICANT CHANGE UP (ref 3.5–5.3)
POTASSIUM SERPL-SCNC: 4.3 MMOL/L — SIGNIFICANT CHANGE UP (ref 3.5–5.3)
POTASSIUM SERPL-SCNC: 5.9 MMOL/L — HIGH (ref 3.5–5.3)
PROTHROM AB SERPL-ACNC: 10.3 SEC — SIGNIFICANT CHANGE UP (ref 9.9–13.4)
PROTHROM AB SERPL-ACNC: 10.7 SEC — SIGNIFICANT CHANGE UP (ref 9.9–13.4)
RBC # BLD: 4.07 M/UL — SIGNIFICANT CHANGE UP (ref 3.8–5.2)
RBC # BLD: 4.62 M/UL — SIGNIFICANT CHANGE UP (ref 3.8–5.2)
RBC # FLD: 14.2 % — SIGNIFICANT CHANGE UP (ref 10.3–14.5)
RBC # FLD: 14.6 % — HIGH (ref 10.3–14.5)
RH IG SCN BLD-IMP: POSITIVE — SIGNIFICANT CHANGE UP
S AUREUS DNA NOSE QL NAA+PROBE: DETECTED
SODIUM SERPL-SCNC: 138 MMOL/L — SIGNIFICANT CHANGE UP (ref 135–145)
SODIUM SERPL-SCNC: 139 MMOL/L — SIGNIFICANT CHANGE UP (ref 135–145)
SODIUM SERPL-SCNC: 140 MMOL/L — SIGNIFICANT CHANGE UP (ref 135–145)
SPECIMEN SOURCE: SIGNIFICANT CHANGE UP
WBC # BLD: 13.97 K/UL — HIGH (ref 3.8–10.5)
WBC # BLD: 16.24 K/UL — HIGH (ref 3.8–10.5)
WBC # FLD AUTO: 13.97 K/UL — HIGH (ref 3.8–10.5)
WBC # FLD AUTO: 16.24 K/UL — HIGH (ref 3.8–10.5)

## 2025-05-05 PROCEDURE — 93308 TTE F-UP OR LMTD: CPT | Mod: 26,GC

## 2025-05-05 PROCEDURE — 99291 CRITICAL CARE FIRST HOUR: CPT | Mod: GC,25

## 2025-05-05 PROCEDURE — 76604 US EXAM CHEST: CPT | Mod: 26,GC

## 2025-05-05 RX ORDER — FLUTICASONE PROPIONATE 50 UG/1
1 SPRAY, METERED NASAL
Refills: 0 | DISCHARGE

## 2025-05-05 RX ORDER — SODIUM CHLORIDE 9 G/1000ML
1000 INJECTION, SOLUTION INTRAVENOUS ONCE
Refills: 0 | Status: COMPLETED | OUTPATIENT
Start: 2025-05-05 | End: 2025-05-05

## 2025-05-05 RX ORDER — CALCIUM GLUCONATE 20 MG/ML
2 INJECTION, SOLUTION INTRAVENOUS ONCE
Refills: 0 | Status: COMPLETED | OUTPATIENT
Start: 2025-05-05 | End: 2025-05-05

## 2025-05-05 RX ORDER — MAGNESIUM SULFATE 500 MG/ML
2 SYRINGE (ML) INJECTION ONCE
Refills: 0 | Status: COMPLETED | OUTPATIENT
Start: 2025-05-05 | End: 2025-05-05

## 2025-05-05 RX ORDER — POTASSIUM CHLORIDE, DEXTROSE MONOHYDRATE AND SODIUM CHLORIDE 150; 5; 900 MG/100ML; G/100ML; MG/100ML
1000 INJECTION, SOLUTION INTRAVENOUS
Refills: 0 | Status: DISCONTINUED | OUTPATIENT
Start: 2025-05-05 | End: 2025-05-06

## 2025-05-05 RX ORDER — SOD PHOS DI, MONO/K PHOS MONO 250 MG
2 TABLET ORAL ONCE
Refills: 0 | Status: COMPLETED | OUTPATIENT
Start: 2025-05-05 | End: 2025-05-05

## 2025-05-05 RX ORDER — DILTIAZEM HYDROCHLORIDE 120 MG/1
1 CAPSULE, EXTENDED RELEASE ORAL
Refills: 0 | DISCHARGE

## 2025-05-05 RX ORDER — SODIUM CHLORIDE 9 G/1000ML
1000 INJECTION, SOLUTION INTRAVENOUS
Refills: 0 | Status: DISCONTINUED | OUTPATIENT
Start: 2025-05-05 | End: 2025-05-05

## 2025-05-05 RX ORDER — SOD PHOS DI, MONO/K PHOS MONO 250 MG
1 TABLET ORAL
Refills: 0 | Status: COMPLETED | OUTPATIENT
Start: 2025-05-05 | End: 2025-05-05

## 2025-05-05 RX ORDER — ALENDRONATE SODIUM 70 MG/1
1 TABLET ORAL
Refills: 0 | DISCHARGE

## 2025-05-05 RX ORDER — POTASSIUM PHOSPHATE, MONOBASIC POTASSIUM PHOSPHATE, DIBASIC INJECTION, 236; 224 MG/ML; MG/ML
30 SOLUTION, CONCENTRATE INTRAVENOUS ONCE
Refills: 0 | Status: COMPLETED | OUTPATIENT
Start: 2025-05-05 | End: 2025-05-05

## 2025-05-05 RX ORDER — ONDANSETRON HCL/PF 4 MG/2 ML
4 VIAL (ML) INJECTION ONCE
Refills: 0 | Status: COMPLETED | OUTPATIENT
Start: 2025-05-05 | End: 2025-05-05

## 2025-05-05 RX ADMIN — Medication 1 TABLET(S): at 11:03

## 2025-05-05 RX ADMIN — CALCIUM GLUCONATE 200 GRAM(S): 20 INJECTION, SOLUTION INTRAVENOUS at 11:48

## 2025-05-05 RX ADMIN — Medication 25 GRAM(S): at 03:34

## 2025-05-05 RX ADMIN — CLOPIDOGREL BISULFATE 75 MILLIGRAM(S): 75 TABLET, FILM COATED ORAL at 05:02

## 2025-05-05 RX ADMIN — Medication 2 PACKET(S): at 20:50

## 2025-05-05 RX ADMIN — POTASSIUM PHOSPHATE, MONOBASIC POTASSIUM PHOSPHATE, DIBASIC INJECTION, 83.33 MILLIMOLE(S): 236; 224 SOLUTION, CONCENTRATE INTRAVENOUS at 03:53

## 2025-05-05 RX ADMIN — Medication 40 MILLIEQUIVALENT(S): at 04:37

## 2025-05-05 RX ADMIN — CALCIUM GLUCONATE 200 GRAM(S): 20 INJECTION, SOLUTION INTRAVENOUS at 11:04

## 2025-05-05 RX ADMIN — Medication 2 PACKET(S): at 15:00

## 2025-05-05 RX ADMIN — Medication 4 MILLIGRAM(S): at 03:53

## 2025-05-05 RX ADMIN — Medication 4 UNIT(S)/HR: at 07:15

## 2025-05-05 RX ADMIN — Medication 4 UNIT(S)/HR: at 19:06

## 2025-05-05 RX ADMIN — POTASSIUM CHLORIDE, DEXTROSE MONOHYDRATE AND SODIUM CHLORIDE 200 MILLILITER(S): 150; 5; 900 INJECTION, SOLUTION INTRAVENOUS at 07:11

## 2025-05-05 RX ADMIN — Medication 40 MILLIEQUIVALENT(S): at 09:27

## 2025-05-05 RX ADMIN — POTASSIUM CHLORIDE, DEXTROSE MONOHYDRATE AND SODIUM CHLORIDE 200 MILLILITER(S): 150; 5; 900 INJECTION, SOLUTION INTRAVENOUS at 19:05

## 2025-05-05 RX ADMIN — Medication 25 GRAM(S): at 15:00

## 2025-05-05 RX ADMIN — Medication 1 LOZENGE: at 17:51

## 2025-05-05 RX ADMIN — Medication 100 MILLIEQUIVALENT(S): at 03:34

## 2025-05-05 RX ADMIN — Medication 1 TABLET(S): at 05:02

## 2025-05-05 RX ADMIN — ENOXAPARIN SODIUM 40 MILLIGRAM(S): 100 INJECTION SUBCUTANEOUS at 05:02

## 2025-05-05 RX ADMIN — Medication 100 MILLIEQUIVALENT(S): at 05:45

## 2025-05-05 RX ADMIN — Medication 100 MILLIEQUIVALENT(S): at 04:38

## 2025-05-05 RX ADMIN — Medication 1 TABLET(S): at 03:29

## 2025-05-05 RX ADMIN — ATORVASTATIN CALCIUM 20 MILLIGRAM(S): 80 TABLET, FILM COATED ORAL at 21:53

## 2025-05-05 RX ADMIN — SODIUM CHLORIDE 1000 MILLILITER(S): 9 INJECTION, SOLUTION INTRAVENOUS at 09:07

## 2025-05-05 RX ADMIN — Medication 40 MILLIEQUIVALENT(S): at 12:50

## 2025-05-05 NOTE — CHART NOTE - NSCHARTNOTEFT_GEN_A_CORE
: Maxine Boyle      INDICATION: shock      PROCEDURE:    [ x ] LIMITED ECHO    [ x ] LIMITED CHEST    [ ] LIMITED RETROPERITONEAL    [ ] LIMITED ABDOMINAL    [ ] LIMITED DVT    [ ] NEEDLE GUIDANCE VASCULAR    [ ] NEEDLE GUIDANCE THORACENTESIS    [ ] NEEDLE GUIDANCE PARACENTESIS    [ ] NEEDLE GUIDANCE PERICARDIOCENTESIS    [ ] OTHER      FINDINGS:    anterior A-lines, no pleural effusions, normal LV size and function, normal RV size.    INTERPRETATION:    Normal cardiopulmonary ultrasound    Images stored on [employer] : Maxine Boyle      INDICATION: HAGMA      PROCEDURE:    [ x ] LIMITED ECHO    [ x ] LIMITED CHEST    [ ] LIMITED RETROPERITONEAL    [ ] LIMITED ABDOMINAL    [ ] LIMITED DVT    [ ] NEEDLE GUIDANCE VASCULAR    [ ] NEEDLE GUIDANCE THORACENTESIS    [ ] NEEDLE GUIDANCE PARACENTESIS    [ ] NEEDLE GUIDANCE PERICARDIOCENTESIS    [ ] OTHER      FINDINGS:    anterior A-lines, no pleural effusions, normal LV size and function, normal RV size.    INTERPRETATION:    Normal cardiopulmonary ultrasound    Images stored on [employer]    Attending Attestation:  I was present during the key portions of the procedure and immediately available during the entire procedure.  I have personally reviewed the images and agree with the interpretation above by the resident/fellow/ACP.    Maxine Cooney MD  Attending  Pulmonary & Critical Care Medicine

## 2025-05-05 NOTE — PROGRESS NOTE ADULT - SUBJECTIVE AND OBJECTIVE BOX
Patient is a 70y old  Female who presents with a chief complaint of Dizziness/ NV (04 May 2025 20:48)      SUBJECTIVE / OVERNIGHT EVENTS:    Pt seen and examined at bedside. Had no complaints. Denied cp, sob, d/n/v    MEDICATIONS  (STANDING):  atorvastatin 20 milliGRAM(s) Oral at bedtime  clopidogrel Tablet 75 milliGRAM(s) Oral every 24 hours  dextrose 5% + lactated ringers with potassium chloride 20 mEq/L 1000 milliLiter(s) (200 mL/Hr) IV Continuous <Continuous>  dextrose 50% Injectable 50 milliLiter(s) IV Push every 15 minutes  enoxaparin Injectable 40 milliGRAM(s) SubCutaneous every 24 hours  insulin regular Infusion 4 Unit(s)/Hr (4 mL/Hr) IV Continuous <Continuous>  multivitamin 1 Tablet(s) Oral daily    MEDICATIONS  (PRN):  albuterol    90 MICROgram(s) HFA Inhaler 2 Puff(s) Inhalation every 6 hours PRN for shortness of breath and/or wheezing      CAPILLARY BLOOD GLUCOSE      POCT Blood Glucose.: 121 mg/dL (05 May 2025 06:56)  POCT Blood Glucose.: 109 mg/dL (05 May 2025 05:58)  POCT Blood Glucose.: 117 mg/dL (05 May 2025 05:01)  POCT Blood Glucose.: 119 mg/dL (05 May 2025 04:07)  POCT Blood Glucose.: 165 mg/dL (05 May 2025 03:17)  POCT Blood Glucose.: 153 mg/dL (05 May 2025 02:04)  POCT Blood Glucose.: 172 mg/dL (05 May 2025 00:59)  POCT Blood Glucose.: 229 mg/dL (04 May 2025 23:59)  POCT Blood Glucose.: 225 mg/dL (04 May 2025 23:02)  POCT Blood Glucose.: 249 mg/dL (04 May 2025 22:04)  POCT Blood Glucose.: 272 mg/dL (04 May 2025 21:30)  POCT Blood Glucose.: 234 mg/dL (04 May 2025 18:11)  POCT Blood Glucose.: 206 mg/dL (04 May 2025 13:30)    I&O's Summary    04 May 2025 07:01  -  05 May 2025 07:00  --------------------------------------------------------  IN: 1928.6 mL / OUT: 1300 mL / NET: 628.6 mL        Vital Signs Last 24 Hrs  T(C): 36.5 (05 May 2025 04:00), Max: 37 (04 May 2025 21:20)  T(F): 97.7 (05 May 2025 04:00), Max: 98.6 (04 May 2025 21:20)  HR: 99 (05 May 2025 07:00) (94 - 135)  BP: 101/52 (05 May 2025 07:00) (101/52 - 132/44)  BP(mean): 67 (05 May 2025 07:00) (67 - 89)  RR: 17 (05 May 2025 07:00) (16 - 30)  SpO2: 100% (05 May 2025 07:00) (96% - 100%)    Parameters below as of 05 May 2025 07:00  Patient On (Oxygen Delivery Method): room air        Physical Exam  CONSTITUTIONAL: NAD  EYES: EOMI, conjunctiva and sclera clear  ENMT: Moist oral mucosa  NECK: Supple  RESPIRATORY: Breathing unlabored, CTAB  CARDIOVASCULAR: S1S2 no MRG  ABDOMEN: Nontender to palpation, normoactive bowel sounds, no rebound/guarding  MUSCULOSKELETAL: no clubbing or cyanosis of digits  NEUROLOGY: No focal deficits   SKIN: No rashes or lesions    LABS:                        11.8   16.24 )-----------( 272      ( 05 May 2025 02:00 )             35.8      05-05    139  |  109[H]  |  10  ----------------------------<  165[H]  2.7[LL]   |  8[LL]  |  0.66    Ca    6.6[L]      05 May 2025 02:00  Phos  0.6     05-05  Mg     1.40     05-05    TPro  8.3  /  Alb  4.7  /  TBili  0.2  /  DBili  x   /  AST  79[H]  /  ALT  20  /  AlkPhos  47  05-04    PT/INR - ( 05 May 2025 02:00 )   PT: 10.7 sec;   INR: 0.90 ratio         PTT - ( 05 May 2025 02:00 )  PTT:23.5 sec      Urinalysis Basic - ( 05 May 2025 02:00 )    Color: x / Appearance: x / SG: x / pH: x  Gluc: 165 mg/dL / Ketone: x  / Bili: x / Urobili: x   Blood: x / Protein: x / Nitrite: x   Leuk Esterase: x / RBC: x / WBC x   Sq Epi: x / Non Sq Epi: x / Bacteria: x        RADIOLOGY & ADDITIONAL TESTS:    Imaging Personally Reviewed:    Consultant(s) Notes Reviewed:      Care Discussed with Consultants/Other Providers:

## 2025-05-06 DIAGNOSIS — E78.5 HYPERLIPIDEMIA, UNSPECIFIED: ICD-10-CM

## 2025-05-06 DIAGNOSIS — E11.65 TYPE 2 DIABETES MELLITUS WITH HYPERGLYCEMIA: ICD-10-CM

## 2025-05-06 DIAGNOSIS — M81.0 AGE-RELATED OSTEOPOROSIS WITHOUT CURRENT PATHOLOGICAL FRACTURE: ICD-10-CM

## 2025-05-06 DIAGNOSIS — I10 ESSENTIAL (PRIMARY) HYPERTENSION: ICD-10-CM

## 2025-05-06 DIAGNOSIS — E11.10 TYPE 2 DIABETES MELLITUS WITH KETOACIDOSIS WITHOUT COMA: ICD-10-CM

## 2025-05-06 LAB
ANION GAP SERPL CALC-SCNC: 10 MMOL/L — SIGNIFICANT CHANGE UP (ref 7–14)
ANION GAP SERPL CALC-SCNC: 13 MMOL/L — SIGNIFICANT CHANGE UP (ref 7–14)
ANION GAP SERPL CALC-SCNC: 16 MMOL/L — HIGH (ref 7–14)
ANION GAP SERPL CALC-SCNC: 8 MMOL/L — SIGNIFICANT CHANGE UP (ref 7–14)
ANISOCYTOSIS BLD QL: SLIGHT — SIGNIFICANT CHANGE UP
APTT BLD: 26.9 SEC — SIGNIFICANT CHANGE UP (ref 26.1–36.8)
B-OH-BUTYR SERPL-SCNC: 0.3 MMOL/L — SIGNIFICANT CHANGE UP (ref 0–0.4)
B-OH-BUTYR SERPL-SCNC: 0.5 MMOL/L — HIGH (ref 0–0.4)
B-OH-BUTYR SERPL-SCNC: 1 MMOL/L — HIGH (ref 0–0.4)
B-OH-BUTYR SERPL-SCNC: 1.5 MMOL/L — HIGH (ref 0–0.4)
B-OH-BUTYR SERPL-SCNC: 2.2 MMOL/L — HIGH (ref 0–0.4)
BASOPHILS # BLD AUTO: 0 K/UL — SIGNIFICANT CHANGE UP (ref 0–0.2)
BASOPHILS NFR BLD AUTO: 0 % — SIGNIFICANT CHANGE UP (ref 0–2)
BILIRUB SERPL-MCNC: 0.5 MG/DL — SIGNIFICANT CHANGE UP (ref 0.2–1.2)
BLD GP AB SCN SERPL QL: NEGATIVE — SIGNIFICANT CHANGE UP
BUN SERPL-MCNC: <2 MG/DL — LOW (ref 7–23)
CALCIUM SERPL-MCNC: 6.9 MG/DL — LOW (ref 8.4–10.5)
CALCIUM SERPL-MCNC: 6.9 MG/DL — LOW (ref 8.4–10.5)
CALCIUM SERPL-MCNC: 7 MG/DL — LOW (ref 8.4–10.5)
CALCIUM SERPL-MCNC: 7.4 MG/DL — LOW (ref 8.4–10.5)
CHLORIDE SERPL-SCNC: 108 MMOL/L — HIGH (ref 98–107)
CHLORIDE SERPL-SCNC: 108 MMOL/L — HIGH (ref 98–107)
CHLORIDE SERPL-SCNC: 109 MMOL/L — HIGH (ref 98–107)
CHLORIDE SERPL-SCNC: 110 MMOL/L — HIGH (ref 98–107)
CO2 SERPL-SCNC: 16 MMOL/L — LOW (ref 22–31)
CO2 SERPL-SCNC: 22 MMOL/L — SIGNIFICANT CHANGE UP (ref 22–31)
CO2 SERPL-SCNC: 22 MMOL/L — SIGNIFICANT CHANGE UP (ref 22–31)
CO2 SERPL-SCNC: 24 MMOL/L — SIGNIFICANT CHANGE UP (ref 22–31)
CREAT SERPL-MCNC: 0.4 MG/DL — LOW (ref 0.5–1.3)
CREAT SERPL-MCNC: 0.42 MG/DL — LOW (ref 0.5–1.3)
CREAT SERPL-MCNC: 0.43 MG/DL — LOW (ref 0.5–1.3)
CREAT SERPL-MCNC: 0.46 MG/DL — LOW (ref 0.5–1.3)
CREAT SERPL-MCNC: 0.46 MG/DL — LOW (ref 0.5–1.3)
CREAT SERPL-MCNC: 0.54 MG/DL — SIGNIFICANT CHANGE UP (ref 0.5–1.3)
EGFR: 102 ML/MIN/1.73M2 — SIGNIFICANT CHANGE UP
EGFR: 104 ML/MIN/1.73M2 — SIGNIFICANT CHANGE UP
EGFR: 104 ML/MIN/1.73M2 — SIGNIFICANT CHANGE UP
EGFR: 105 ML/MIN/1.73M2 — SIGNIFICANT CHANGE UP
EGFR: 105 ML/MIN/1.73M2 — SIGNIFICANT CHANGE UP
EGFR: 106 ML/MIN/1.73M2 — SIGNIFICANT CHANGE UP
EGFR: 106 ML/MIN/1.73M2 — SIGNIFICANT CHANGE UP
EGFR: 98 ML/MIN/1.73M2 — SIGNIFICANT CHANGE UP
EGFR: 98 ML/MIN/1.73M2 — SIGNIFICANT CHANGE UP
EOSINOPHIL # BLD AUTO: 0 K/UL — SIGNIFICANT CHANGE UP (ref 0–0.5)
EOSINOPHIL NFR BLD AUTO: 0 % — SIGNIFICANT CHANGE UP (ref 0–6)
GAS PNL BLDA: SIGNIFICANT CHANGE UP
GAS PNL BLDV: SIGNIFICANT CHANGE UP
GAS PNL BLDV: SIGNIFICANT CHANGE UP
GLUCOSE BLDC GLUCOMTR-MCNC: 139 MG/DL — HIGH (ref 70–99)
GLUCOSE BLDC GLUCOMTR-MCNC: 139 MG/DL — HIGH (ref 70–99)
GLUCOSE BLDC GLUCOMTR-MCNC: 140 MG/DL — HIGH (ref 70–99)
GLUCOSE BLDC GLUCOMTR-MCNC: 145 MG/DL — HIGH (ref 70–99)
GLUCOSE BLDC GLUCOMTR-MCNC: 149 MG/DL — HIGH (ref 70–99)
GLUCOSE BLDC GLUCOMTR-MCNC: 150 MG/DL — HIGH (ref 70–99)
GLUCOSE BLDC GLUCOMTR-MCNC: 152 MG/DL — HIGH (ref 70–99)
GLUCOSE BLDC GLUCOMTR-MCNC: 153 MG/DL — HIGH (ref 70–99)
GLUCOSE BLDC GLUCOMTR-MCNC: 153 MG/DL — HIGH (ref 70–99)
GLUCOSE BLDC GLUCOMTR-MCNC: 154 MG/DL — HIGH (ref 70–99)
GLUCOSE BLDC GLUCOMTR-MCNC: 158 MG/DL — HIGH (ref 70–99)
GLUCOSE BLDC GLUCOMTR-MCNC: 161 MG/DL — HIGH (ref 70–99)
GLUCOSE BLDC GLUCOMTR-MCNC: 162 MG/DL — HIGH (ref 70–99)
GLUCOSE BLDC GLUCOMTR-MCNC: 163 MG/DL — HIGH (ref 70–99)
GLUCOSE BLDC GLUCOMTR-MCNC: 164 MG/DL — HIGH (ref 70–99)
GLUCOSE BLDC GLUCOMTR-MCNC: 167 MG/DL — HIGH (ref 70–99)
GLUCOSE BLDC GLUCOMTR-MCNC: 168 MG/DL — HIGH (ref 70–99)
GLUCOSE BLDC GLUCOMTR-MCNC: 178 MG/DL — HIGH (ref 70–99)
GLUCOSE SERPL-MCNC: 154 MG/DL — HIGH (ref 70–99)
GLUCOSE SERPL-MCNC: 176 MG/DL — HIGH (ref 70–99)
GLUCOSE SERPL-MCNC: 176 MG/DL — HIGH (ref 70–99)
GLUCOSE SERPL-MCNC: 189 MG/DL — HIGH (ref 70–99)
GLUCOSE SERPL-MCNC: 191 MG/DL — HIGH (ref 70–99)
HCT VFR BLD CALC: 35.1 % — SIGNIFICANT CHANGE UP (ref 34.5–45)
HGB BLD-MCNC: 12.1 G/DL — SIGNIFICANT CHANGE UP (ref 11.5–15.5)
IANC: 6.24 K/UL — SIGNIFICANT CHANGE UP (ref 1.8–7.4)
INR BLD: 0.94 RATIO — SIGNIFICANT CHANGE UP (ref 0.85–1.16)
LACTATE BLDV-MCNC: 1.7 MMOL/L — SIGNIFICANT CHANGE UP (ref 0.5–2)
LYMPHOCYTES # BLD AUTO: 0.83 K/UL — LOW (ref 1–3.3)
LYMPHOCYTES # BLD AUTO: 9.6 % — LOW (ref 13–44)
MAGNESIUM SERPL-MCNC: 1.7 MG/DL — SIGNIFICANT CHANGE UP (ref 1.6–2.6)
MAGNESIUM SERPL-MCNC: 1.9 MG/DL — SIGNIFICANT CHANGE UP (ref 1.6–2.6)
MANUAL SMEAR VERIFICATION: SIGNIFICANT CHANGE UP
MCHC RBC-ENTMCNC: 28.7 PG — SIGNIFICANT CHANGE UP (ref 27–34)
MCHC RBC-ENTMCNC: 34.5 G/DL — SIGNIFICANT CHANGE UP (ref 32–36)
MCV RBC AUTO: 83.4 FL — SIGNIFICANT CHANGE UP (ref 80–100)
MELD SCORE WITH DIALYSIS: 20 POINTS — SIGNIFICANT CHANGE UP
MELD SCORE WITHOUT DIALYSIS: 6 POINTS — SIGNIFICANT CHANGE UP
MICROCYTES BLD QL: SLIGHT — SIGNIFICANT CHANGE UP
MONOCYTES # BLD AUTO: 0.61 K/UL — SIGNIFICANT CHANGE UP (ref 0–0.9)
MONOCYTES NFR BLD AUTO: 7 % — SIGNIFICANT CHANGE UP (ref 2–14)
NEUTROPHILS # BLD AUTO: 7.1 K/UL — SIGNIFICANT CHANGE UP (ref 1.8–7.4)
NEUTROPHILS NFR BLD AUTO: 81.7 % — HIGH (ref 43–77)
PHOSPHATE SERPL-MCNC: 1.2 MG/DL — LOW (ref 2.5–4.5)
PHOSPHATE SERPL-MCNC: 1.8 MG/DL — LOW (ref 2.5–4.5)
PHOSPHATE SERPL-MCNC: 2.2 MG/DL — LOW (ref 2.5–4.5)
PHOSPHATE SERPL-MCNC: 2.7 MG/DL — SIGNIFICANT CHANGE UP (ref 2.5–4.5)
PHOSPHATE SERPL-MCNC: 3.6 MG/DL — SIGNIFICANT CHANGE UP (ref 2.5–4.5)
PLAT MORPH BLD: NORMAL — SIGNIFICANT CHANGE UP
PLATELET # BLD AUTO: 239 K/UL — SIGNIFICANT CHANGE UP (ref 150–400)
PLATELET COUNT - ESTIMATE: NORMAL — SIGNIFICANT CHANGE UP
POLYCHROMASIA BLD QL SMEAR: SLIGHT — SIGNIFICANT CHANGE UP
POTASSIUM SERPL-MCNC: 3.8 MMOL/L — SIGNIFICANT CHANGE UP (ref 3.5–5.3)
POTASSIUM SERPL-MCNC: 3.8 MMOL/L — SIGNIFICANT CHANGE UP (ref 3.5–5.3)
POTASSIUM SERPL-MCNC: 3.9 MMOL/L — SIGNIFICANT CHANGE UP (ref 3.5–5.3)
POTASSIUM SERPL-MCNC: 3.9 MMOL/L — SIGNIFICANT CHANGE UP (ref 3.5–5.3)
POTASSIUM SERPL-SCNC: 3.8 MMOL/L — SIGNIFICANT CHANGE UP (ref 3.5–5.3)
POTASSIUM SERPL-SCNC: 3.8 MMOL/L — SIGNIFICANT CHANGE UP (ref 3.5–5.3)
POTASSIUM SERPL-SCNC: 3.9 MMOL/L — SIGNIFICANT CHANGE UP (ref 3.5–5.3)
POTASSIUM SERPL-SCNC: 3.9 MMOL/L — SIGNIFICANT CHANGE UP (ref 3.5–5.3)
PROTHROM AB SERPL-ACNC: 10.9 SEC — SIGNIFICANT CHANGE UP (ref 9.9–13.4)
RBC # BLD: 4.21 M/UL — SIGNIFICANT CHANGE UP (ref 3.8–5.2)
RBC # FLD: 14.6 % — HIGH (ref 10.3–14.5)
RBC BLD AUTO: ABNORMAL
RH IG SCN BLD-IMP: POSITIVE — SIGNIFICANT CHANGE UP
SMUDGE CELLS # BLD: PRESENT — SIGNIFICANT CHANGE UP
SODIUM SERPL-SCNC: 140 MMOL/L — SIGNIFICANT CHANGE UP (ref 135–145)
SODIUM SERPL-SCNC: 141 MMOL/L — SIGNIFICANT CHANGE UP (ref 135–145)
SODIUM SERPL-SCNC: 145 MMOL/L — SIGNIFICANT CHANGE UP (ref 135–145)
VARIANT LYMPHS # BLD: 1.7 % — SIGNIFICANT CHANGE UP (ref 0–6)
VARIANT LYMPHS NFR BLD MANUAL: 1.7 % — SIGNIFICANT CHANGE UP (ref 0–6)
WBC # BLD: 8.69 K/UL — SIGNIFICANT CHANGE UP (ref 3.8–10.5)
WBC # FLD AUTO: 8.69 K/UL — SIGNIFICANT CHANGE UP (ref 3.8–10.5)

## 2025-05-06 PROCEDURE — 99291 CRITICAL CARE FIRST HOUR: CPT | Mod: GC

## 2025-05-06 PROCEDURE — 99223 1ST HOSP IP/OBS HIGH 75: CPT

## 2025-05-06 RX ORDER — INSULIN LISPRO 100 U/ML
INJECTION, SOLUTION INTRAVENOUS; SUBCUTANEOUS ONCE
Refills: 0 | Status: COMPLETED | OUTPATIENT
Start: 2025-05-07 | End: 2025-05-07

## 2025-05-06 RX ORDER — SODIUM CHLORIDE 9 G/1000ML
1000 INJECTION, SOLUTION INTRAVENOUS
Refills: 0 | Status: DISCONTINUED | OUTPATIENT
Start: 2025-05-06 | End: 2025-05-09

## 2025-05-06 RX ORDER — MUPIROCIN CALCIUM 20 MG/G
1 CREAM TOPICAL
Refills: 0 | Status: DISCONTINUED | OUTPATIENT
Start: 2025-05-06 | End: 2025-05-06

## 2025-05-06 RX ORDER — POTASSIUM CHLORIDE, DEXTROSE MONOHYDRATE AND SODIUM CHLORIDE 150; 5; 900 MG/100ML; G/100ML; MG/100ML
1000 INJECTION, SOLUTION INTRAVENOUS
Refills: 0 | Status: DISCONTINUED | OUTPATIENT
Start: 2025-05-06 | End: 2025-05-06

## 2025-05-06 RX ORDER — SODIUM CHLORIDE 9 G/1000ML
1000 INJECTION, SOLUTION INTRAVENOUS ONCE
Refills: 0 | Status: COMPLETED | OUTPATIENT
Start: 2025-05-06 | End: 2025-05-06

## 2025-05-06 RX ORDER — SOD PHOS DI, MONO/K PHOS MONO 250 MG
1 TABLET ORAL
Refills: 0 | Status: COMPLETED | OUTPATIENT
Start: 2025-05-06 | End: 2025-05-06

## 2025-05-06 RX ORDER — MAGNESIUM SULFATE 500 MG/ML
2 SYRINGE (ML) INJECTION ONCE
Refills: 0 | Status: COMPLETED | OUTPATIENT
Start: 2025-05-06 | End: 2025-05-06

## 2025-05-06 RX ORDER — MUPIROCIN CALCIUM 20 MG/G
1 CREAM TOPICAL
Refills: 0 | Status: DISCONTINUED | OUTPATIENT
Start: 2025-05-06 | End: 2025-05-08

## 2025-05-06 RX ORDER — INSULIN LISPRO 100 U/ML
INJECTION, SOLUTION INTRAVENOUS; SUBCUTANEOUS EVERY 4 HOURS
Refills: 0 | Status: DISCONTINUED | OUTPATIENT
Start: 2025-05-06 | End: 2025-05-06

## 2025-05-06 RX ORDER — GLUCAGON 3 MG/1
1 POWDER NASAL ONCE
Refills: 0 | Status: DISCONTINUED | OUTPATIENT
Start: 2025-05-06 | End: 2025-05-09

## 2025-05-06 RX ORDER — POTASSIUM PHOSPHATE, MONOBASIC POTASSIUM PHOSPHATE, DIBASIC INJECTION, 236; 224 MG/ML; MG/ML
30 SOLUTION, CONCENTRATE INTRAVENOUS ONCE
Refills: 0 | Status: COMPLETED | OUTPATIENT
Start: 2025-05-06 | End: 2025-05-06

## 2025-05-06 RX ORDER — IBUPROFEN 200 MG
400 TABLET ORAL ONCE
Refills: 0 | Status: COMPLETED | OUTPATIENT
Start: 2025-05-06 | End: 2025-05-06

## 2025-05-06 RX ORDER — INSULIN GLARGINE-YFGN 100 [IU]/ML
10 INJECTION, SOLUTION SUBCUTANEOUS ONCE
Refills: 0 | Status: DISCONTINUED | OUTPATIENT
Start: 2025-05-06 | End: 2025-05-06

## 2025-05-06 RX ORDER — INSULIN LISPRO 100 U/ML
INJECTION, SOLUTION INTRAVENOUS; SUBCUTANEOUS
Refills: 0 | Status: DISCONTINUED | OUTPATIENT
Start: 2025-05-06 | End: 2025-05-06

## 2025-05-06 RX ORDER — INSULIN GLARGINE-YFGN 100 [IU]/ML
10 INJECTION, SOLUTION SUBCUTANEOUS
Refills: 0 | Status: DISCONTINUED | OUTPATIENT
Start: 2025-05-06 | End: 2025-05-06

## 2025-05-06 RX ORDER — INSULIN GLARGINE-YFGN 100 [IU]/ML
8 INJECTION, SOLUTION SUBCUTANEOUS
Refills: 0 | Status: DISCONTINUED | OUTPATIENT
Start: 2025-05-06 | End: 2025-05-07

## 2025-05-06 RX ORDER — SOD PHOS DI, MONO/K PHOS MONO 250 MG
1 TABLET ORAL ONCE
Refills: 0 | Status: COMPLETED | OUTPATIENT
Start: 2025-05-06 | End: 2025-05-06

## 2025-05-06 RX ORDER — INSULIN LISPRO 100 U/ML
INJECTION, SOLUTION INTRAVENOUS; SUBCUTANEOUS
Refills: 0 | Status: DISCONTINUED | OUTPATIENT
Start: 2025-05-06 | End: 2025-05-09

## 2025-05-06 RX ORDER — SODIUM CHLORIDE 9 G/1000ML
1000 INJECTION, SOLUTION INTRAVENOUS ONCE
Refills: 0 | Status: DISCONTINUED | OUTPATIENT
Start: 2025-05-06 | End: 2025-05-06

## 2025-05-06 RX ORDER — INSULIN LISPRO 100 U/ML
2 INJECTION, SOLUTION INTRAVENOUS; SUBCUTANEOUS
Refills: 0 | Status: DISCONTINUED | OUTPATIENT
Start: 2025-05-06 | End: 2025-05-07

## 2025-05-06 RX ORDER — INSULIN LISPRO 100 U/ML
3 INJECTION, SOLUTION INTRAVENOUS; SUBCUTANEOUS
Refills: 0 | Status: DISCONTINUED | OUTPATIENT
Start: 2025-05-06 | End: 2025-05-06

## 2025-05-06 RX ORDER — POTASSIUM CHLORIDE, DEXTROSE MONOHYDRATE AND SODIUM CHLORIDE 150; 5; 900 MG/100ML; G/100ML; MG/100ML
1000 INJECTION, SOLUTION INTRAVENOUS
Refills: 0 | Status: DISCONTINUED | OUTPATIENT
Start: 2025-05-06 | End: 2025-05-07

## 2025-05-06 RX ORDER — DEXTROSE 50 % IN WATER 50 %
15 SYRINGE (ML) INTRAVENOUS ONCE
Refills: 0 | Status: DISCONTINUED | OUTPATIENT
Start: 2025-05-06 | End: 2025-05-09

## 2025-05-06 RX ORDER — INSULIN LISPRO 100 U/ML
INJECTION, SOLUTION INTRAVENOUS; SUBCUTANEOUS AT BEDTIME
Refills: 0 | Status: DISCONTINUED | OUTPATIENT
Start: 2025-05-06 | End: 2025-05-06

## 2025-05-06 RX ADMIN — SODIUM CHLORIDE 1000 MILLILITER(S): 9 INJECTION, SOLUTION INTRAVENOUS at 11:29

## 2025-05-06 RX ADMIN — Medication 2 UNIT(S)/HR: at 08:31

## 2025-05-06 RX ADMIN — INSULIN LISPRO 1: 100 INJECTION, SOLUTION INTRAVENOUS; SUBCUTANEOUS at 18:27

## 2025-05-06 RX ADMIN — POTASSIUM CHLORIDE, DEXTROSE MONOHYDRATE AND SODIUM CHLORIDE 200 MILLILITER(S): 150; 5; 900 INJECTION, SOLUTION INTRAVENOUS at 08:31

## 2025-05-06 RX ADMIN — ATORVASTATIN CALCIUM 20 MILLIGRAM(S): 80 TABLET, FILM COATED ORAL at 21:51

## 2025-05-06 RX ADMIN — INSULIN LISPRO 2 UNIT(S): 100 INJECTION, SOLUTION INTRAVENOUS; SUBCUTANEOUS at 18:28

## 2025-05-06 RX ADMIN — Medication 1 TABLET(S): at 02:42

## 2025-05-06 RX ADMIN — Medication 1 TABLET(S): at 13:46

## 2025-05-06 RX ADMIN — INSULIN GLARGINE-YFGN 8 UNIT(S): 100 INJECTION, SOLUTION SUBCUTANEOUS at 12:30

## 2025-05-06 RX ADMIN — Medication 1 TABLET(S): at 20:28

## 2025-05-06 RX ADMIN — CLOPIDOGREL BISULFATE 75 MILLIGRAM(S): 75 TABLET, FILM COATED ORAL at 05:13

## 2025-05-06 RX ADMIN — ENOXAPARIN SODIUM 40 MILLIGRAM(S): 100 INJECTION SUBCUTANEOUS at 05:13

## 2025-05-06 RX ADMIN — MUPIROCIN CALCIUM 1 APPLICATION(S): 20 CREAM TOPICAL at 18:27

## 2025-05-06 RX ADMIN — POTASSIUM PHOSPHATE, MONOBASIC POTASSIUM PHOSPHATE, DIBASIC INJECTION, 83.33 MILLIMOLE(S): 236; 224 SOLUTION, CONCENTRATE INTRAVENOUS at 01:08

## 2025-05-06 RX ADMIN — Medication 25 GRAM(S): at 15:30

## 2025-05-06 RX ADMIN — POTASSIUM CHLORIDE, DEXTROSE MONOHYDRATE AND SODIUM CHLORIDE 150 MILLILITER(S): 150; 5; 900 INJECTION, SOLUTION INTRAVENOUS at 11:43

## 2025-05-06 RX ADMIN — POTASSIUM CHLORIDE, DEXTROSE MONOHYDRATE AND SODIUM CHLORIDE 200 MILLILITER(S): 150; 5; 900 INJECTION, SOLUTION INTRAVENOUS at 02:42

## 2025-05-06 RX ADMIN — Medication 400 MILLIGRAM(S): at 05:09

## 2025-05-06 RX ADMIN — Medication 400 MILLIGRAM(S): at 04:17

## 2025-05-06 RX ADMIN — Medication 1 LOZENGE: at 19:26

## 2025-05-06 RX ADMIN — POTASSIUM CHLORIDE, DEXTROSE MONOHYDRATE AND SODIUM CHLORIDE 150 MILLILITER(S): 150; 5; 900 INJECTION, SOLUTION INTRAVENOUS at 19:26

## 2025-05-06 RX ADMIN — Medication 1 TABLET(S): at 01:03

## 2025-05-06 RX ADMIN — Medication 1 APPLICATION(S): at 05:13

## 2025-05-06 RX ADMIN — Medication 1 TABLET(S): at 18:27

## 2025-05-06 RX ADMIN — Medication 40 MILLIEQUIVALENT(S): at 15:31

## 2025-05-06 RX ADMIN — POTASSIUM CHLORIDE, DEXTROSE MONOHYDRATE AND SODIUM CHLORIDE 200 MILLILITER(S): 150; 5; 900 INJECTION, SOLUTION INTRAVENOUS at 01:03

## 2025-05-06 RX ADMIN — Medication 1 LOZENGE: at 01:16

## 2025-05-06 NOTE — DIETITIAN INITIAL EVALUATION ADULT - DIET TYPE
Recommend SLP to assess for appropriate/safe diet texture for patient given her reports of swallowing discomfort.     When medically feasible, advance diet to consistent carbohydrate lacto-vegetarian diet.

## 2025-05-06 NOTE — DIETITIAN INITIAL EVALUATION ADULT - REASON FOR ADMISSION
Dizziness/ NV  Per chart, patient is a " 70yoF PMH HTN, CVA no deficits on plavix, DM recently placed on SYnjardy (metformin/empagliflozin) 3 weeks ago, & increased on her ozempic a few days, ago, p/w multiple episodes of nbnb emesis, Kussmaul breathing, c/w diabetic ketoacidosis."

## 2025-05-06 NOTE — CONSULT NOTE ADULT - ASSESSMENT
70F PMH HTN, CVA (on plavix monotherapy), osteoporosis, T2DM c/b mild proliferative diabetic retinopathy (non-insulin dependent) recently placed on Synjardy (metformin/empagliflozin) 3 weeks ago, & increased on her ozempic a few days ago, gastritis, pancreatic cyst, and multiple pulmonary nodules p/w DKA (pH 6.97, BHB >9, bicarb 8, BS 300s). s/p insulin gtt in MICU, transitioned to basal insulin 5/6 however still with poor appetite, resumed NPO status with q4 moderate Admelog scale. Endocrine consulted for further management.     #DKA  #T2DM with hyperglycemia  - A1c 8.6%  - Home meds: Synjardy 12.5-1000 mg, Ozempic 1 mg  - Follows with Dr. Arben Cardoza  - Started on insulin gtt --> transitioned to Lantus 8 units at 13h46 on 5/6, however still with poor PO and rising BHB, made NPO and put on moderate q4 Admelog scale    PLAN  While inpatient:  - c/w q4 moderate Admelog scale while NPO  - c/w dextrose gtt per MICU DKA protocol  - Repeat DKA labs q4 hours   - Once able to tolerate diet, start Admelog 3 units tidac and change to low dose Admelog scale with meals and low dose Admelog scale at bedtime with FS ac/hs  - Continue Lantus 8 units q 24 hours  - Goal -180  - Hypoglycemia protocol  - Appreciate Nutrition recs  - Please obtain lipid panel in AM    Discharge Recommendations:  - Discontinue Synjardy  - Continue with Ozempic 1 mg  - Start metformin 1000 mg bid   - Patient should check FSBG premeals and bedtime. Patient should call their doctor when FSBG <70 or above >400 and or consistently above 200s as changes in the regimen will have to be made.   - Follow up with Dr. Cardoza (scheduled for 6/19/25)  - Routine follow-up with Ophthalmology and Podiatry     #HTN  - Goal BP <130/80  - Management per primary team  - UACR as outpatient     #Osteoporosis  - Continue with alendronate weekly  - OP DEXA     #HLD  - Goal LDL <70  - Obtain lipid panel in AM  - c/w atorvstatin 20 mg    D/w primary team     Patricia Land MD  Endocrinology Fellow  Can be reached via Microsoft Teams    For follow up questions, discharge recommendations, or new consults, please email [email or web address] ([email or web address]) or [email or web address] ([facility]) or call answering service at [phone] (weekdays); [phone] (nights/weekends).  For emergencies please page fellow on call.     **************************RECOMMENDATIONS NOT FINAL UNTIL SIGNED BY ATTENDING**************************  70F PMH HTN, CVA (on plavix monotherapy), osteoporosis, T2DM c/b mild proliferative diabetic retinopathy (non-insulin dependent) recently placed on Synjardy (metformin/empagliflozin) 3 weeks ago, & increased on her ozempic a few days ago, gastritis, pancreatic cyst, and multiple pulmonary nodules p/w DKA (pH 6.97, BHB >9, bicarb 8, BS 300s). s/p insulin gtt in MICU, transitioned to basal insulin 5/6 however still with poor appetite, resumed NPO status with q4 low dose  Admelog scale. Endocrine consulted for further management.     #DKA  #T2DM with hyperglycemia  - A1c 8.6%  - Home meds: Synjardy 12.5-1000 mg, Ozempic 1 mg  - Follows with Dr. Arben Cardoza  - Started on insulin gtt --> transitioned to Lantus 8 units at 13h46 on 5/6, however still with poor PO and rising BHB, made NPO and put on low dose scale q4 Admelog scale    PLAN  While inpatient:  if unable to tolerate PO or NPO:  pt will need lantus +dextrose to remain out of DKA and correction scale as follows:  - c/w q4 hr low dose Admelog scale while NPO  D5 IV infusion titrated to maintain target -180 mg/dL  taper down dextrose when above 200 for two FSBG  (please place on the pump for IVF with dextrose so it will run at a set rate)   NOTE: Avoid dextrose containing solutions except for treatment of hypoglycemia or if NPO  - Repeat DKA labs to ensure stability           if pt going to eat:  stop dextrose   carb consistent diet   - Once able to tolerate diet, start Admelog 2 units tidac and change to low dose Admelog scale with meals and low dose Admelog scale at bedtime with FS ac/hs  - Continue Lantus 8 units q 24 hours--> this needs to be timed for 10am tomorrow  - Goal -180  - Hypoglycemia protocol  - Appreciate Nutrition recs  - Please obtain lipid panel in AM      labs to check   cpeptide with am labs   anti ISLET  anti ALISSA   zn transporter 8   IA -2 antibody       Discharge Recommendations:  - Discontinue Synjardy  -would hold ozempic given n/v at home- reasses outpt   - Start metformin 1000 mg bid   - Patient should check FSBG premeals and bedtime. Patient should call their doctor when FSBG <70 or above >400 and or consistently above 200s as changes in the regimen will have to be made.   - Follow up with Dr. Cardoza (scheduled for 6/19/25)  - Routine follow-up with Ophthalmology and Podiatry     #HTN  - Goal BP <130/80  - Management per primary team  - UACR as outpatient     #Osteoporosis  -pt on alendronate outpt - fu with outpt endocrine   - OP DEXA     #HLD  - Goal LDL <70  - Obtain lipid panel in AM  - c/w atorvstatin 20 mg    D/w primary team     Patricia Land MD  Endocrinology Fellow  Can be reached via Microsoft Teams    For follow up questions, discharge recommendations, or new consults, please email [email or web address]) or [email or web address] ([facility]) or call answering service at [phone] (weekdays); [phone] (nights/weekends).  For emergencies please page fellow on call.

## 2025-05-06 NOTE — DIETITIAN INITIAL EVALUATION ADULT - OTHER INFO
In house, patient has been NPO given high anion gap, metabolic acidosis 2/2 DKA. Gap currently closed. Per MD team, diet to be advanced soon.     Pt provided with written and verbal nutrition education on type 2 DM diet in conjunction with Lacto-Vegetarian diet. Topics discussed include: MyPlate healthy eating guidelines, Food Lists, avoidance of sugar sweetened beverages and recommended alternatives, label reading, sources of carbohydrates, carbohydrate counting, complex vs simple carbohydrates, inclusion of whole grains and fiber, mixed meals (pairing protein with carbohydrate for optimal blood glucose response), hypoglycemia prevention/management and timing of meals/snacks. Reviewed increased protein foods on Lacto-vegetarian diet. Discussed importance of self monitoring blood glucose and encouraged outpatient endocrinology follow up. Pt verbalized understanding of nutrition education. Patient was also offered oral nutrition supplements to optimize intake.

## 2025-05-06 NOTE — DIETITIAN INITIAL EVALUATION ADULT - PERTINENT LABORATORY DATA
05-06    141  |  109[H]  |  <2[L]  ----------------------------<  189[H]  3.8   |  22  |  0.40[L]    Ca    6.9[L]      06 May 2025 12:26  Phos  2.2     05-06  Mg     1.70     05-06    TPro  x   /  Alb  x   /  TBili  0.5  /  DBili  x   /  AST  x   /  ALT  x   /  AlkPhos  x   05-06  POCT Blood Glucose.: 164 mg/dL (05-06-25 @ 13:16)  A1C with Estimated Average Glucose Result: 8.6 % (05-04-25 @ 15:14)

## 2025-05-06 NOTE — CONSULT NOTE ADULT - SUBJECTIVE AND OBJECTIVE BOX
Patricia Land MD   |   PGY-4  Endocrinology Fellow  Available on Microsoft Teams    HPI:  70F PMH HTN, CVA (on plavix monotherapy), T2DM c/b mild proliferative diabetic retinopathy (non-insulin dependent) recently placed on SYnjardy (metformin/empagliflozin) 3 weeks ago, & increased on her ozempic a few days, ago, gastritis, pancreatic cyst, and multiple pulmonary nodules p/w multiple episodes of nbnb emesis that started 1 day ago. Pt's son states that he noticed she was beginning to have symptoms on Thursday. That same day, she experienced some nausea and vomited. The family contacted her PCP who notified her that some of these symptoms may be due to the dose increase of her Ozempic from 0.5-1. She has had fatigue and GI symptoms since starting Ozempic. On Friday, she felt better. Then on Saturday, she began to have recurrent bouts of emesis, nausea, and difficulties with breathing. Pt denies abd pain, fevers, SOB, diarrhea, constipation, sick contacts, chest pain. Review of systems + for polydypsia and back pain. In ED, exam positive for lethargy and Kussmaul respirations. Noted to have pH 6.97, BHB >9, bicarb 8, BS 300s  Treated w/ fluids and bicarb an admitted to MICU for DKA management insulin gtt.    Endocrine History:    DM2 dx in 1997  Endocrinologist: Dr. Arben Cardoza  Last A1c 8.6%  Current GFR 106ml/min/1.73m2  BMI: 24.2  Weight: 56.3 kg  Current Meds: Synjardy 12.5-1000 mg (started 3 weeks ago), Ozempic 1 mg (recently increased)  Side effects to medications: Patient denies diarrhea, decreased appetite, nausea/vomiting, weight gain/loss, lower leg swelling, UTIs, pancreatitis  Has h/o multiple UTI  Medications tried in past: metformin, Janumet, Jardiance, Farxiga, Actos, glipizide  Who administers medications: Self  Glucose monitoring:  Checks FS daily, says AM BG in 170-180 range  Hypoglycemic episodes? Denies  Any hx of DKA: Denies prior events  Microvascular complications: +mild proliferative DR, denies neuropathy or nephropathy  Macrovascular complications: +CVA. Denies CAD hx  Outpatient diet: Vegetarian - rice, lentils, beans, greens/vegetables. Diet soda if any, rare juice. Avoids sweets   Activity: Walks 20 min daily  Appetite:  Poor      PAST MEDICAL & SURGICAL HISTORY:  Diabetes      Hypertension      Asthma      TIA (transient ischemic attack)      Uterine fibroid      H/O fracture of wrist      Pancreatic cyst      Multiple pulmonary nodules      GERD (gastroesophageal reflux disease)      Gastritis      Hepatic steatosis      Pseudophakia, right eye      Proliferative diabetic retinopathy      Tubal ligation status      H/O abdominal hysterectomy      Hx of cholecystectomy      Delivery By Elective Caesarean Section      S/P tonsillectomy          FAMILY HISTORY:  No MTC/MEN2    SOCIAL HISTORY:  No alcohol or tobacco use      MEDICATIONS  (STANDING):  atorvastatin 20 milliGRAM(s) Oral at bedtime  chlorhexidine 2% Cloths 1 Application(s) Topical <User Schedule>  clopidogrel Tablet 75 milliGRAM(s) Oral every 24 hours  dextrose 5% + lactated ringers with potassium chloride 20 mEq/L 1000 milliLiter(s) (150 mL/Hr) IV Continuous <Continuous>  dextrose 5%. 1000 milliLiter(s) (50 mL/Hr) IV Continuous <Continuous>  dextrose 5%. 1000 milliLiter(s) (100 mL/Hr) IV Continuous <Continuous>  dextrose 50% Injectable 50 milliLiter(s) IV Push every 15 minutes  enoxaparin Injectable 40 milliGRAM(s) SubCutaneous every 24 hours  glucagon  Injectable 1 milliGRAM(s) IntraMuscular once  insulin glargine Injectable (LANTUS) 8 Unit(s) SubCutaneous <User Schedule>  insulin lispro (ADMELOG) corrective regimen sliding scale   SubCutaneous every 4 hours  multivitamin 1 Tablet(s) Oral daily  mupirocin 2% Nasal 1 Application(s) Both Nostrils two times a day    MEDICATIONS  (PRN):  albuterol    90 MICROgram(s) HFA Inhaler 2 Puff(s) Inhalation every 6 hours PRN for shortness of breath and/or wheezing  benzocaine/menthol Lozenge 1 Lozenge Oral every 2 hours PRN Sore Throat  dextrose Oral Gel 15 Gram(s) Oral once PRN Blood Glucose LESS THAN 70 milliGRAM(s)/deciliter      Allergies    Tylenol (Hives)    Intolerances      Review of Systems:  Constitutional: No fever  Eyes: No blurry vision  Neuro: No tremors, numbness/tingling  HEENT: No pain, dysphagia, dysphonia  Cardiovascular: No chest pain, palpitations  Respiratory: No SOB  GI: No nausea, vomiting, abdominal pain  Endocrine: no polyuria, polydipsia  Hem/lymph: no swelling  Osteoporosis: no fractures    ===================PHYSICAL EXAM=======================  VITALS: T(C): 36.4 (05-06-25 @ 12:00)  T(F): 97.6 (05-06-25 @ 12:00), Max: 99.3 (05-06-25 @ 04:00)  HR: 82 (05-06-25 @ 15:00) (81 - 102)  BP: 129/65 (05-06-25 @ 15:00) (110/55 - 139/68)  RR:  (13 - 25)  SpO2:  (99% - 100%)  Wt(kg): --  GENERAL: NAD  EYES: No proptosis, no lid lag, anicteric  THYROID: Normal size, no palpable nodules  RESPIRATORY: Clear to auscultation bilaterally  CARDIOVASCULAR: Regular rate and rhythm  GI: Soft, nontender, non distended  EXT: b/l feet without wounds, 2+ pulses, no edema  PSYCH: Alert and oriented x 3, reactive mood  ======================================================  POCT Blood Glucose.: 152 mg/dL (05-06-25 @ 15:15)  POCT Blood Glucose.: 149 mg/dL (05-06-25 @ 14:05)  POCT Blood Glucose.: 164 mg/dL (05-06-25 @ 13:16)  POCT Blood Glucose.: 178 mg/dL (05-06-25 @ 12:09)  POCT Blood Glucose.: 161 mg/dL (05-06-25 @ 11:15)  POCT Blood Glucose.: 145 mg/dL (05-06-25 @ 10:10)  POCT Blood Glucose.: 140 mg/dL (05-06-25 @ 09:06)  POCT Blood Glucose.: 139 mg/dL (05-06-25 @ 08:01)  POCT Blood Glucose.: 153 mg/dL (05-06-25 @ 06:55)  POCT Blood Glucose.: 168 mg/dL (05-06-25 @ 05:55)  POCT Blood Glucose.: 163 mg/dL (05-06-25 @ 05:06)  POCT Blood Glucose.: 167 mg/dL (05-06-25 @ 04:04)  POCT Blood Glucose.: 139 mg/dL (05-06-25 @ 02:54)  POCT Blood Glucose.: 153 mg/dL (05-06-25 @ 01:57)  POCT Blood Glucose.: 150 mg/dL (05-06-25 @ 00:58)  POCT Blood Glucose.: 168 mg/dL (05-05-25 @ 23:58)  POCT Blood Glucose.: 184 mg/dL (05-05-25 @ 23:01)  POCT Blood Glucose.: 202 mg/dL (05-05-25 @ 21:57)  POCT Blood Glucose.: 181 mg/dL (05-05-25 @ 21:01)  POCT Blood Glucose.: 156 mg/dL (05-05-25 @ 19:59)  POCT Blood Glucose.: 147 mg/dL (05-05-25 @ 19:02)  POCT Blood Glucose.: 144 mg/dL (05-05-25 @ 18:06)  POCT Blood Glucose.: 158 mg/dL (05-05-25 @ 17:02)  POCT Blood Glucose.: 190 mg/dL (05-05-25 @ 15:55)  POCT Blood Glucose.: 157 mg/dL (05-05-25 @ 15:07)  POCT Blood Glucose.: 164 mg/dL (05-05-25 @ 14:05)  POCT Blood Glucose.: 162 mg/dL (05-05-25 @ 12:57)  POCT Blood Glucose.: 137 mg/dL (05-05-25 @ 12:01)  POCT Blood Glucose.: 144 mg/dL (05-05-25 @ 10:58)  POCT Blood Glucose.: 135 mg/dL (05-05-25 @ 10:12)  POCT Blood Glucose.: 159 mg/dL (05-05-25 @ 09:02)  POCT Blood Glucose.: 137 mg/dL (05-05-25 @ 07:54)  POCT Blood Glucose.: 121 mg/dL (05-05-25 @ 06:56)  POCT Blood Glucose.: 109 mg/dL (05-05-25 @ 05:58)  POCT Blood Glucose.: 117 mg/dL (05-05-25 @ 05:01)  POCT Blood Glucose.: 119 mg/dL (05-05-25 @ 04:07)  POCT Blood Glucose.: 165 mg/dL (05-05-25 @ 03:17)  POCT Blood Glucose.: 153 mg/dL (05-05-25 @ 02:04)  POCT Blood Glucose.: 172 mg/dL (05-05-25 @ 00:59)  POCT Blood Glucose.: 229 mg/dL (05-04-25 @ 23:59)  POCT Blood Glucose.: 225 mg/dL (05-04-25 @ 23:02)  POCT Blood Glucose.: 249 mg/dL (05-04-25 @ 22:04)  POCT Blood Glucose.: 272 mg/dL (05-04-25 @ 21:30)  POCT Blood Glucose.: 234 mg/dL (05-04-25 @ 18:11)  POCT Blood Glucose.: 206 mg/dL (05-04-25 @ 13:30)                            12.1   8.69  )-----------( 239      ( 06 May 2025 04:30 )             35.1       05-06    141  |  109[H]  |  <2[L]  ----------------------------<  189[H]  3.8   |  22  |  0.40[L]    eGFR: 106    Ca    6.9[L]      05-06  Mg     1.70     05-06  Phos  2.2     05-06    TPro  x   /  Alb  x   /  TBili  0.5  /  DBili  x   /  AST  x   /  ALT  x   /  AlkPhos  x   05-06      Thyroid Function Tests:      A1C with Estimated Average Glucose Result: 8.6 % (05-04-25 @ 15:14)          Radiology:

## 2025-05-06 NOTE — DIETITIAN INITIAL EVALUATION ADULT - ORAL NUTRITION SUPPLEMENTS
department to provide Magic Cup Reduced Sugar 2x/day (280kcals, 9g protein per serving) to promote optimal PO intake

## 2025-05-06 NOTE — PROGRESS NOTE ADULT - SUBJECTIVE AND OBJECTIVE BOX
Patient is a 70y old  Female who presents with a chief complaint of Dizziness/ NV (05 May 2025 07:04)      SUBJECTIVE / OVERNIGHT EVENTS:  24hrs event  - insulin per DKA protocol    Pt seen and examined at bedside. Had no complaints. Denied cp, sob, d/n/v    MEDICATIONS  (STANDING):  atorvastatin 20 milliGRAM(s) Oral at bedtime  chlorhexidine 2% Cloths 1 Application(s) Topical <User Schedule>  clopidogrel Tablet 75 milliGRAM(s) Oral every 24 hours  dextrose 5% + lactated ringers with potassium chloride 20 mEq/L 1000 milliLiter(s) (200 mL/Hr) IV Continuous <Continuous>  dextrose 50% Injectable 50 milliLiter(s) IV Push every 15 minutes  enoxaparin Injectable 40 milliGRAM(s) SubCutaneous every 24 hours  insulin regular Infusion 2 Unit(s)/Hr (2 mL/Hr) IV Continuous <Continuous>  multivitamin 1 Tablet(s) Oral daily    MEDICATIONS  (PRN):  albuterol    90 MICROgram(s) HFA Inhaler 2 Puff(s) Inhalation every 6 hours PRN for shortness of breath and/or wheezing  benzocaine/menthol Lozenge 1 Lozenge Oral every 2 hours PRN Sore Throat      CAPILLARY BLOOD GLUCOSE      POCT Blood Glucose.: 153 mg/dL (06 May 2025 06:55)  POCT Blood Glucose.: 168 mg/dL (06 May 2025 05:55)  POCT Blood Glucose.: 163 mg/dL (06 May 2025 05:06)  POCT Blood Glucose.: 167 mg/dL (06 May 2025 04:04)  POCT Blood Glucose.: 139 mg/dL (06 May 2025 02:54)  POCT Blood Glucose.: 153 mg/dL (06 May 2025 01:57)  POCT Blood Glucose.: 150 mg/dL (06 May 2025 00:58)  POCT Blood Glucose.: 168 mg/dL (05 May 2025 23:58)  POCT Blood Glucose.: 184 mg/dL (05 May 2025 23:01)  POCT Blood Glucose.: 202 mg/dL (05 May 2025 21:57)  POCT Blood Glucose.: 181 mg/dL (05 May 2025 21:01)  POCT Blood Glucose.: 156 mg/dL (05 May 2025 19:59)  POCT Blood Glucose.: 147 mg/dL (05 May 2025 19:02)  POCT Blood Glucose.: 144 mg/dL (05 May 2025 18:06)  POCT Blood Glucose.: 158 mg/dL (05 May 2025 17:02)  POCT Blood Glucose.: 190 mg/dL (05 May 2025 15:55)  POCT Blood Glucose.: 157 mg/dL (05 May 2025 15:07)  POCT Blood Glucose.: 164 mg/dL (05 May 2025 14:05)  POCT Blood Glucose.: 162 mg/dL (05 May 2025 12:57)  POCT Blood Glucose.: 137 mg/dL (05 May 2025 12:01)  POCT Blood Glucose.: 144 mg/dL (05 May 2025 10:58)  POCT Blood Glucose.: 135 mg/dL (05 May 2025 10:12)  POCT Blood Glucose.: 159 mg/dL (05 May 2025 09:02)    I&O's Summary    05 May 2025 07:01  -  06 May 2025 07:00  --------------------------------------------------------  IN: 6860.4 mL / OUT: 4550 mL / NET: 2310.4 mL        Vital Signs Last 24 Hrs  T(C): 37.4 (06 May 2025 04:00), Max: 37.4 (06 May 2025 04:00)  T(F): 99.3 (06 May 2025 04:00), Max: 99.3 (06 May 2025 04:00)  HR: 90 (06 May 2025 07:00) (83 - 102)  BP: 129/70 (06 May 2025 07:00) (98/59 - 139/68)  BP(mean): 87 (06 May 2025 07:00) (63 - 90)  RR: 17 (06 May 2025 07:00) (13 - 25)  SpO2: 100% (06 May 2025 07:00) (99% - 100%)    Parameters below as of 06 May 2025 07:00  Patient On (Oxygen Delivery Method): room air        Physical Exam  CONSTITUTIONAL: NAD  EYES: EOMI, conjunctiva and sclera clear  ENMT: Moist oral mucosa  NECK: Supple  RESPIRATORY: Breathing unlabored, CTAB  CARDIOVASCULAR: S1S2 no MRG  ABDOMEN: Nontender to palpation, normoactive bowel sounds, no rebound/guarding  MUSCULOSKELETAL: no clubbing or cyanosis of digits  NEUROLOGY: No focal deficits   SKIN: No rashes or lesions    LABS:                        12.1   8.69  )-----------( 239      ( 06 May 2025 04:30 )             35.1      05-06    140  |  108[H]  |  <2[L]  ----------------------------<  176[H]  3.9   |  16[L]  |  0.46[L]    Ca    6.9[L]      06 May 2025 04:30  Phos  3.6     05-06  Mg     1.90     05-06    TPro  x   /  Alb  x   /  TBili  0.5  /  DBili  x   /  AST  x   /  ALT  x   /  AlkPhos  x   05-06    PT/INR - ( 06 May 2025 04:30 )   PT: 10.9 sec;   INR: 0.94 ratio         PTT - ( 06 May 2025 04:30 )  PTT:26.9 sec      Urinalysis Basic - ( 06 May 2025 04:30 )    Color: x / Appearance: x / SG: x / pH: x  Gluc: 176 mg/dL / Ketone: x  / Bili: x / Urobili: x   Blood: x / Protein: x / Nitrite: x   Leuk Esterase: x / RBC: x / WBC x   Sq Epi: x / Non Sq Epi: x / Bacteria: x        RADIOLOGY & ADDITIONAL TESTS:    Imaging Personally Reviewed:    Consultant(s) Notes Reviewed:      Care Discussed with Consultants/Other Providers:

## 2025-05-06 NOTE — CHART NOTE - NSCHARTNOTEFT_GEN_A_CORE
MICU Transfer Note    Transfer from: MICU    Transfer to: (  ) Medicine    (  ) Telemetry     (   ) RCU        (    ) Palliative         (   ) Stroke Unit          (   ) __________________    Accepting Physician:    Signout given to:       HPI: 70yoF PMH HTN, CVA (on plavix monotherapy), T2DM c/b mild proliferative diabetic retinopathy (non-insulin dependent) recently placed on SYnjardy (metformin/empagliflozin) 3 weeks ago, & increased on her ozempic a few days, ago, gastritis, pancreatic cyst, and multiple pulmonary nodules p/w multiple episodes of nbnb emesis that started 1 day ago. Pt's son states that he noticed she was beginning to have symptoms on Thursday. That same day, she experienced some nausea and vomited. The family contacted her PCP who notified her that some of these symptoms may be due to the dose increase of her Ozempic from 0.5-1. She has had fatigue and GI symptoms since starting Ozempic. On Friday, she felt better. Then on Saturday, she began to have recurrent bouts of emesis, nausea, and difficulties with breathing. Pt denies abd pain, fevers, SOB, diarrhea, constipation, sick contacts, chest pain. Review of systems + for polydypsia and back pain.  MICU consulted for evaluation of severe abdominal deficits and c/f  DKA/dehydration    ED course: VS: T 97.3, , /71, RR 18, SpO2 96% RA. PE there positive for lethargy and Kussmaul respirations. In the ED noted to have pH 6.9, Bicarb 8. BS 300s  Treated w/ fluids and bicarb. Admitted to MICU for insulin drip for treatment of DKA.     MICU Course: Patient       Assessment/Plan:      To Do:  [ ]  [ ]  [ ] MICU Transfer Note    Transfer from: MICU    Transfer to: (X) Medicine    (  ) Telemetry     (   ) RCU        (    ) Palliative         (   ) Stroke Unit          (   ) __________________    Accepting Physician:    Signout given to:       HPI: 70yoF PMH HTN, CVA (on plavix monotherapy), T2DM c/b mild proliferative diabetic retinopathy (non-insulin dependent) recently placed on SYnjardy (metformin/empagliflozin) 3 weeks ago, & increased on her ozempic a few days, ago, gastritis, pancreatic cyst, and multiple pulmonary nodules p/w multiple episodes of nbnb emesis that started 1 day ago. Pt's son states that he noticed she was beginning to have symptoms on Thursday. That same day, she experienced some nausea and vomited. The family contacted her PCP who notified her that some of these symptoms may be due to the dose increase of her Ozempic from 0.5-1. She has had fatigue and GI symptoms since starting Ozempic. On Friday, she felt better. Then on Saturday, she began to have recurrent bouts of emesis, nausea, and difficulties with breathing. Pt denies abd pain, fevers, SOB, diarrhea, constipation, sick contacts, chest pain. Review of systems + for polydypsia and back pain.  MICU consulted for evaluation of severe abdominal deficits and c/f  DKA/dehydration    ED course: VS: T 97.3, , /71, RR 18, SpO2 96% RA. PE there positive for lethargy and Kussmaul respirations. In the ED noted to have pH 6.9, Bicarb 8. BS 300s  Treated w/ fluids and bicarb. Admitted to MICU for insulin drip for treatment of euglycemic DKA on 5/4.    MICU Course: Pt received insulin per DKA protocol, as well as fluid resuscitation + lyte repletion. Endocrine was consulted for further assistance with management of DKA and to evaluate medication regimen. Pt was recommended to DC Synjardy and hold Ozempic until endocrine follow up appointment and that the patient be discharged on insulin w/ c-peptide testing. On 5/6, the patient's AG closed and the patient was converted to basal bolus 8 lantus 3 premeal. The patient is now stable for transfer to the floors.      Assessment/Plan:    70yoF PMH HTN, CVA no deficits on plavix, DM recently placed on SYnjardy (metformin/empagliflozin) 3 weeks ago, & increased on her ozempic a few days, ago, p/w multiple episodes of nbnb emesis, Kussmaul breathing, c/w diabetic ketoacidosis.     Neurology   A&Ox3  #Hx of CVA  -Resume plavix     #C/f metabolic encephalopathy given DKA  -q4 neuro checks     Cardiology   #Tachycardia (Likely iso dehydration given unimpressive U/A w/ trace bacteria); sinus on telemetry; RRR No murmurs, rubs, gallops on PE  -12 Lead EKG to evaluate for arrhythmia    -Continuous telemetry    Respiratory  -Continuous pulse oximetry  - q4 VBGs to evaluate acidemia and monitor improvement     #Hx of multiple pulmonary nodules (finding on CT March 2024), bronchiectasis  -Follow up outpatient pulm in 6 months and outpatient CT for disease surveillance and to monitor progression     Endocrine   #Diabetic ketoacidosis (Euglycemic initially)  #T2DM c/b mild diabetic retinopathy, non-insulin dependent  -Etiology: Most likely drug induced from SGLT-2 and/or change in ozempic,  ddx: UTI though U/A not too impressive  Labs: Glucose >1000 in urine, ketonuria, mild protein proteinuria, pyuria, trace bacteria  -Insulin gtt  per DKA protocol  -D5LR w/  20 mEq K  -Glucose fingersticks q1   -Trend BHB, BMP, and VBGs q4  -Maintain NPO until gap closes and patient transitioned  -F/u urine cx    Renal   High Anion Gap Metabolic Acidosis 2/2 DKA  -Labs: pH venous 6.97, BHB >9.0, bicarb 7, AG >38 - improving  - S/p HCO3- infusion for acidemia <7.10 and 4L volume repletion w/ NS  -Rest of plan as documented in endocrine section     Gastroenterology  #Hx of gastritis   -Patient has a self-reported Tylenol allergy; takes ibuprofen for pain; found to have gastritis on EGD   -c/w home pantoprazole  -Limit NSAID use     #Hx of Pancreatic Cyst   -F/u as an outpatient     Infectious Diseases   #Leukocytosis   -possibly iso dehydration d/t DKA vs stress vs possible infection (unlikely)  Labs: WBC 21.19, U/A w/ trace bacteria and small LE   -F/u results of urine cx    Hematology/Oncology   #Leukocytosis, Thrombocytosis, Erythrocytosis suspect iso dehydration  -CTM     DVT prophylaxis  Lovenox 40 mg qd      To Do:  [ ] F/u Endocrine recs: pt will need to stop Jardiance (synjardy) and ozempic outpt, obtain cpeptide, will need dc atleast on basal insulin+oral based on inpt trend  [ ] F/u cpeptide, anti ISLET, anti ALISSA, zn transporter 8, IA -2 antibody   [ ] Follow up with Dr. Cardoza (scheduled for 6/19/25)  [ ] Routine follow-up with Ophthalmology and Podiatry   [ ] Outpatient followup for hx of pancreatic cyst and multiple pulmonary nodules MICU Transfer Note    Transfer from: MICU    Transfer to: (X) Medicine    (  ) Telemetry     (   ) RCU        (    ) Palliative         (   ) Stroke Unit          (   ) __________________    Accepting Physician: Dr. Jean Fernández    Signout given to:       HPI: 70yoF PMH HTN, CVA (on plavix monotherapy), T2DM c/b mild proliferative diabetic retinopathy (non-insulin dependent) recently placed on SYnjardy (metformin/empagliflozin) 3 weeks ago, & increased on her ozempic a few days, ago, gastritis, pancreatic cyst, and multiple pulmonary nodules p/w multiple episodes of nbnb emesis that started 1 day ago. Pt's son states that he noticed she was beginning to have symptoms on Thursday. That same day, she experienced some nausea and vomited. The family contacted her PCP who notified her that some of these symptoms may be due to the dose increase of her Ozempic from 0.5-1. She has had fatigue and GI symptoms since starting Ozempic. On Friday, she felt better. Then on Saturday, she began to have recurrent bouts of emesis, nausea, and difficulties with breathing. Pt denies abd pain, fevers, SOB, diarrhea, constipation, sick contacts, chest pain. Review of systems + for polydypsia and back pain.  MICU consulted for evaluation of severe abdominal deficits and c/f  DKA/dehydration    ED course: VS: T 97.3, , /71, RR 18, SpO2 96% RA. PE there positive for lethargy and Kussmaul respirations. In the ED noted to have pH 6.9, Bicarb 8. BS 300s  Treated w/ fluids and bicarb. Admitted to MICU for insulin drip for treatment of euglycemic DKA on 5/4.    MICU Course: Pt received insulin per DKA protocol, as well as fluid resuscitation + lyte repletion. Endocrine was consulted for further assistance with management of DKA and to evaluate medication regimen. Pt was recommended to DC Synjardy and hold Ozempic until endocrine follow up appointment and that the patient be discharged on insulin w/ c-peptide testing. On 5/6, the patient's AG closed and the patient was converted to basal bolus 8 lantus 3 premeal. The patient is now stable for transfer to the floors.      Assessment/Plan:    70yoF PMH HTN, CVA no deficits on plavix, DM recently placed on SYnjardy (metformin/empagliflozin) 3 weeks ago, & increased on her ozempic a few days, ago, p/w multiple episodes of nbnb emesis, Kussmaul breathing, c/w diabetic ketoacidosis.     Neurology   A&Ox3  #Hx of CVA  -Resume plavix     #C/f metabolic encephalopathy given DKA  -q4 neuro checks     Cardiology   #Tachycardia (Likely iso dehydration given unimpressive U/A w/ trace bacteria); sinus on telemetry; RRR No murmurs, rubs, gallops on PE  -12 Lead EKG to evaluate for arrhythmia    -Continuous telemetry    Respiratory  -Continuous pulse oximetry  - q4 VBGs to evaluate acidemia and monitor improvement     #Hx of multiple pulmonary nodules (finding on CT March 2024), bronchiectasis  -Follow up outpatient pulm in 6 months and outpatient CT for disease surveillance and to monitor progression     Endocrine   #Diabetic ketoacidosis (Euglycemic initially)  #T2DM c/b mild diabetic retinopathy, non-insulin dependent  -Etiology: Most likely drug induced from SGLT-2 and/or change in ozempic,  ddx: UTI though U/A not too impressive  Labs: Glucose >1000 in urine, ketonuria, mild protein proteinuria, pyuria, trace bacteria  -Insulin gtt  per DKA protocol  -D5LR w/  20 mEq K  -Glucose fingersticks q1   -Trend BHB, BMP, and VBGs q4  -Maintain NPO until gap closes and patient transitioned  -F/u urine cx    Renal   High Anion Gap Metabolic Acidosis 2/2 DKA  -Labs: pH venous 6.97, BHB >9.0, bicarb 7, AG >38 - improving  - S/p HCO3- infusion for acidemia <7.10 and 4L volume repletion w/ NS  -Rest of plan as documented in endocrine section     Gastroenterology  #Hx of gastritis   -Patient has a self-reported Tylenol allergy; takes ibuprofen for pain; found to have gastritis on EGD   -c/w home pantoprazole  -Limit NSAID use     #Hx of Pancreatic Cyst   -F/u as an outpatient     Infectious Diseases   #Leukocytosis   -possibly iso dehydration d/t DKA vs stress vs possible infection (unlikely)  Labs: WBC 21.19, U/A w/ trace bacteria and small LE   -F/u results of urine cx    Hematology/Oncology   #Leukocytosis, Thrombocytosis, Erythrocytosis suspect iso dehydration  -CTM     DVT prophylaxis  Lovenox 40 mg qd      To Do:  [ ] F/u Endocrine recs: pt will need to stop Jardiance (synjardy) and ozempic outpt, obtain cpeptide, will need dc atleast on basal insulin+oral based on inpt trend  [ ] F/u cpeptide, anti ISLET, anti ALISSA, zn transporter 8, IA -2 antibody   [ ] Follow up with Dr. Cardoza (scheduled for 6/19/25)  [ ] Routine follow-up with Ophthalmology and Podiatry   [ ] Outpatient followup for hx of pancreatic cyst and multiple pulmonary nodules

## 2025-05-06 NOTE — DIETITIAN INITIAL EVALUATION ADULT - ORAL INTAKE PTA/DIET HISTORY
Visited patient at bedside today. Daughter at bedside as well to provide subjective nutrition history. Reported patient is a Lacto-Vegetarian. Reported some swallowing discomfort, reporting she feels like every time she swallows, she is "swallowing rocks." Noted with history of DM, A1C 8.6%, takes metformin 500mg 2x BID, ozempic (dose was recently increased per patient). Has been taking Ozempic for about 1.5 years, last dose was last Thursday 5/1/25. Reported decreased appetite since starting Ozempic. Endorsed 30lbs weight loss since her first dose. Reported weight before Ozempic was 145lbs, most recent weight reported as 117lbs. Per María SY, weight history as follows: 54kg 4/18/24, 55.4kg 6/20/24, 56.7kg 3/19/25, 54kg 5/4/25. Reports she supplements with a MVI and Vitamin B12 daily. Endorsed symptoms of nausea and vomiting PTA

## 2025-05-06 NOTE — DIETITIAN INITIAL EVALUATION ADULT - ADD RECOMMEND
Monitor PO intake, tolerance to diet/supplement, nutrition related lab values, weight trends, BMs/GI distress, hydration status, skin integrity     Shawna Chun MS, RD - available via [facility] teams

## 2025-05-06 NOTE — CONSULT NOTE ADULT - ATTENDING COMMENTS
70F PMH HTN, CVA (on plavix monotherapy), osteoporosis, T2DM c/b mild proliferative diabetic retinopathy (non-insulin dependent) recently placed on Synjardy (metformin/empagliflozin) 3 weeks ago, & increased on her ozempic a few days ago, gastritis, pancreatic cyst, and multiple pulmonary nodules p/w DKA (pH 6.97, BHB >9, bicarb 8, BS 300s). s/p insulin gtt in MICU, transitioned to basal insulin 5/6 however still with poor appetite, resumed NPO status with q4 low dose  Admelog scale. Endocrine consulted for further management.   insulin drip stopped this am  d/w ICU team to administer Lantus 8 units and bc not eating, c/w dextrose with q4hr low dose scale  please see reccs for NPO /Diet as outlined above    pt will need to stop Jardiance (synjardy) and ozempic outpt  obtain cpeptide   will need dc atleast on basal insulin+oral based on inpt trend      Nika Guadarrama MD  Attending Physician   Department of Endocrinology, Diabetes and Metabolism     weekdays: 9am to 5pm: email [facility] or [facility] and or TEAMS     Nights and weekends: [phone]  Please note that this patient may be followed by a different provider tomorrow.   If no answer or after hours, please contact [phone].  For final dc reccomendations, please call [phone] or page the endocrine fellow on call.      complex pt high level decision making   Spent over 80 minutes providing face to face education as well as assessing  pt/labs/meds and discussing plan with primary team and pt   this included review of Vitals, meds, new results/radiology, interdisciplinary charting reviewed, Patient seen and examined and plan discussed, all questions were answered to the best of my ability  Charting/documentation and discussion with pt and ICU team

## 2025-05-06 NOTE — DIETITIAN INITIAL EVALUATION ADULT - PERTINENT MEDS FT
MEDICATIONS  (STANDING):  atorvastatin 20 milliGRAM(s) Oral at bedtime  chlorhexidine 2% Cloths 1 Application(s) Topical <User Schedule>  clopidogrel Tablet 75 milliGRAM(s) Oral every 24 hours  dextrose 5% + lactated ringers with potassium chloride 20 mEq/L 1000 milliLiter(s) (150 mL/Hr) IV Continuous <Continuous>  dextrose 5%. 1000 milliLiter(s) (50 mL/Hr) IV Continuous <Continuous>  dextrose 5%. 1000 milliLiter(s) (100 mL/Hr) IV Continuous <Continuous>  dextrose 50% Injectable 50 milliLiter(s) IV Push every 15 minutes  enoxaparin Injectable 40 milliGRAM(s) SubCutaneous every 24 hours  glucagon  Injectable 1 milliGRAM(s) IntraMuscular once  insulin glargine Injectable (LANTUS) 8 Unit(s) SubCutaneous <User Schedule>  insulin lispro (ADMELOG) corrective regimen sliding scale   SubCutaneous every 4 hours  multivitamin 1 Tablet(s) Oral daily  mupirocin 2% Ointment 1 Application(s) Topical two times a day    MEDICATIONS  (PRN):  albuterol    90 MICROgram(s) HFA Inhaler 2 Puff(s) Inhalation every 6 hours PRN for shortness of breath and/or wheezing  benzocaine/menthol Lozenge 1 Lozenge Oral every 2 hours PRN Sore Throat  dextrose Oral Gel 15 Gram(s) Oral once PRN Blood Glucose LESS THAN 70 milliGRAM(s)/deciliter

## 2025-05-07 ENCOUNTER — TRANSCRIPTION ENCOUNTER (OUTPATIENT)
Age: 70
End: 2025-05-07

## 2025-05-07 LAB
ANION GAP SERPL CALC-SCNC: 10 MMOL/L — SIGNIFICANT CHANGE UP (ref 7–14)
APTT BLD: 25.6 SEC — LOW (ref 26.1–36.8)
BASOPHILS # BLD AUTO: 0.03 K/UL — SIGNIFICANT CHANGE UP (ref 0–0.2)
BASOPHILS NFR BLD AUTO: 0.5 % — SIGNIFICANT CHANGE UP (ref 0–2)
BUN SERPL-MCNC: <2 MG/DL — LOW (ref 7–23)
CALCIUM SERPL-MCNC: 7.5 MG/DL — LOW (ref 8.4–10.5)
CHLORIDE SERPL-SCNC: 107 MMOL/L — SIGNIFICANT CHANGE UP (ref 98–107)
CO2 SERPL-SCNC: 22 MMOL/L — SIGNIFICANT CHANGE UP (ref 22–31)
CREAT SERPL-MCNC: 0.45 MG/DL — LOW (ref 0.5–1.3)
EGFR: 103 ML/MIN/1.73M2 — SIGNIFICANT CHANGE UP
EGFR: 103 ML/MIN/1.73M2 — SIGNIFICANT CHANGE UP
EOSINOPHIL # BLD AUTO: 0.12 K/UL — SIGNIFICANT CHANGE UP (ref 0–0.5)
EOSINOPHIL NFR BLD AUTO: 1.9 % — SIGNIFICANT CHANGE UP (ref 0–6)
GAS PNL BLDV: SIGNIFICANT CHANGE UP
GLUCOSE BLDC GLUCOMTR-MCNC: 150 MG/DL — HIGH (ref 70–99)
GLUCOSE BLDC GLUCOMTR-MCNC: 150 MG/DL — HIGH (ref 70–99)
GLUCOSE BLDC GLUCOMTR-MCNC: 162 MG/DL — HIGH (ref 70–99)
GLUCOSE BLDC GLUCOMTR-MCNC: 189 MG/DL — HIGH (ref 70–99)
GLUCOSE BLDC GLUCOMTR-MCNC: 253 MG/DL — HIGH (ref 70–99)
GLUCOSE SERPL-MCNC: 194 MG/DL — HIGH (ref 70–99)
HCT VFR BLD CALC: 36.2 % — SIGNIFICANT CHANGE UP (ref 34.5–45)
HGB BLD-MCNC: 12.4 G/DL — SIGNIFICANT CHANGE UP (ref 11.5–15.5)
IANC: 3.53 K/UL — SIGNIFICANT CHANGE UP (ref 1.8–7.4)
IMM GRANULOCYTES NFR BLD AUTO: 0.3 % — SIGNIFICANT CHANGE UP (ref 0–0.9)
INR BLD: 0.91 RATIO — SIGNIFICANT CHANGE UP (ref 0.85–1.16)
LYMPHOCYTES # BLD AUTO: 2.2 K/UL — SIGNIFICANT CHANGE UP (ref 1–3.3)
LYMPHOCYTES # BLD AUTO: 34.4 % — SIGNIFICANT CHANGE UP (ref 13–44)
MAGNESIUM SERPL-MCNC: 2 MG/DL — SIGNIFICANT CHANGE UP (ref 1.6–2.6)
MCHC RBC-ENTMCNC: 29.5 PG — SIGNIFICANT CHANGE UP (ref 27–34)
MCHC RBC-ENTMCNC: 34.3 G/DL — SIGNIFICANT CHANGE UP (ref 32–36)
MCV RBC AUTO: 86 FL — SIGNIFICANT CHANGE UP (ref 80–100)
MONOCYTES # BLD AUTO: 0.49 K/UL — SIGNIFICANT CHANGE UP (ref 0–0.9)
MONOCYTES NFR BLD AUTO: 7.7 % — SIGNIFICANT CHANGE UP (ref 2–14)
NEUTROPHILS # BLD AUTO: 3.53 K/UL — SIGNIFICANT CHANGE UP (ref 1.8–7.4)
NEUTROPHILS NFR BLD AUTO: 55.2 % — SIGNIFICANT CHANGE UP (ref 43–77)
NRBC # BLD AUTO: 0 K/UL — SIGNIFICANT CHANGE UP (ref 0–0)
NRBC # FLD: 0 K/UL — SIGNIFICANT CHANGE UP (ref 0–0)
NRBC BLD AUTO-RTO: 0 /100 WBCS — SIGNIFICANT CHANGE UP (ref 0–0)
PHOSPHATE SERPL-MCNC: 2.1 MG/DL — LOW (ref 2.5–4.5)
PLATELET # BLD AUTO: 214 K/UL — SIGNIFICANT CHANGE UP (ref 150–400)
POTASSIUM SERPL-MCNC: 4.1 MMOL/L — SIGNIFICANT CHANGE UP (ref 3.5–5.3)
POTASSIUM SERPL-SCNC: 4.1 MMOL/L — SIGNIFICANT CHANGE UP (ref 3.5–5.3)
PROTHROM AB SERPL-ACNC: 10.8 SEC — SIGNIFICANT CHANGE UP (ref 9.9–13.4)
RBC # BLD: 4.21 M/UL — SIGNIFICANT CHANGE UP (ref 3.8–5.2)
RBC # FLD: 14.8 % — HIGH (ref 10.3–14.5)
SODIUM SERPL-SCNC: 139 MMOL/L — SIGNIFICANT CHANGE UP (ref 135–145)
WBC # BLD: 6.39 K/UL — SIGNIFICANT CHANGE UP (ref 3.8–10.5)
WBC # FLD AUTO: 6.39 K/UL — SIGNIFICANT CHANGE UP (ref 3.8–10.5)

## 2025-05-07 PROCEDURE — 99233 SBSQ HOSP IP/OBS HIGH 50: CPT | Mod: GC

## 2025-05-07 PROCEDURE — 99232 SBSQ HOSP IP/OBS MODERATE 35: CPT

## 2025-05-07 RX ORDER — INSULIN LISPRO 100 U/ML
INJECTION, SOLUTION INTRAVENOUS; SUBCUTANEOUS AT BEDTIME
Refills: 0 | Status: DISCONTINUED | OUTPATIENT
Start: 2025-05-07 | End: 2025-05-09

## 2025-05-07 RX ORDER — EMPAGLIFLOZIN AND METFORMIN HYDROCHLORIDE 12.5; 1 MG/1; MG/1
1 TABLET ORAL
Refills: 0 | DISCHARGE

## 2025-05-07 RX ORDER — SOD PHOS DI, MONO/K PHOS MONO 250 MG
2 TABLET ORAL ONCE
Refills: 0 | Status: DISCONTINUED | OUTPATIENT
Start: 2025-05-07 | End: 2025-05-07

## 2025-05-07 RX ORDER — ORAL SEMAGLUTIDE 14 MG/1
0 TABLET ORAL
Refills: 0 | DISCHARGE

## 2025-05-07 RX ORDER — SOD PHOS DI, MONO/K PHOS MONO 250 MG
2 TABLET ORAL ONCE
Refills: 0 | Status: COMPLETED | OUTPATIENT
Start: 2025-05-07 | End: 2025-05-07

## 2025-05-07 RX ORDER — ISOPROPYL ALCOHOL, BENZOCAINE .7; .06 ML/ML; ML/ML
0 SWAB TOPICAL
Qty: 100 | Refills: 1
Start: 2025-05-07

## 2025-05-07 RX ORDER — INSULIN GLARGINE-YFGN 100 [IU]/ML
10 INJECTION, SOLUTION SUBCUTANEOUS
Refills: 0 | Status: DISCONTINUED | OUTPATIENT
Start: 2025-05-07 | End: 2025-05-09

## 2025-05-07 RX ORDER — ORAL SEMAGLUTIDE 14 MG/1
1 TABLET ORAL
Refills: 0 | DISCHARGE

## 2025-05-07 RX ORDER — INSULIN GLARGINE-YFGN 100 [IU]/ML
8 INJECTION, SOLUTION SUBCUTANEOUS
Qty: 1 | Refills: 0
Start: 2025-05-07 | End: 2025-06-05

## 2025-05-07 RX ORDER — INSULIN LISPRO 100 U/ML
3 INJECTION, SOLUTION INTRAVENOUS; SUBCUTANEOUS
Refills: 0 | Status: DISCONTINUED | OUTPATIENT
Start: 2025-05-07 | End: 2025-05-09

## 2025-05-07 RX ORDER — CALCIUM GLUCONATE 20 MG/ML
2 INJECTION, SOLUTION INTRAVENOUS ONCE
Refills: 0 | Status: COMPLETED | OUTPATIENT
Start: 2025-05-07 | End: 2025-05-07

## 2025-05-07 RX ORDER — METFORMIN HYDROCHLORIDE 850 MG/1
2 TABLET ORAL
Qty: 120 | Refills: 0
Start: 2025-05-07 | End: 2025-06-05

## 2025-05-07 RX ORDER — INSULIN GLARGINE-YFGN 100 [IU]/ML
10 INJECTION, SOLUTION SUBCUTANEOUS
Qty: 1 | Refills: 0
Start: 2025-05-07 | End: 2025-06-05

## 2025-05-07 RX ADMIN — INSULIN LISPRO 3: 100 INJECTION, SOLUTION INTRAVENOUS; SUBCUTANEOUS at 11:49

## 2025-05-07 RX ADMIN — CLOPIDOGREL BISULFATE 75 MILLIGRAM(S): 75 TABLET, FILM COATED ORAL at 06:04

## 2025-05-07 RX ADMIN — MUPIROCIN CALCIUM 1 APPLICATION(S): 20 CREAM TOPICAL at 19:02

## 2025-05-07 RX ADMIN — CALCIUM GLUCONATE 200 GRAM(S): 20 INJECTION, SOLUTION INTRAVENOUS at 04:12

## 2025-05-07 RX ADMIN — Medication 2 PACKET(S): at 11:48

## 2025-05-07 RX ADMIN — Medication 1 TABLET(S): at 11:48

## 2025-05-07 RX ADMIN — INSULIN LISPRO 2 UNIT(S): 100 INJECTION, SOLUTION INTRAVENOUS; SUBCUTANEOUS at 11:49

## 2025-05-07 RX ADMIN — INSULIN LISPRO 3 UNIT(S): 100 INJECTION, SOLUTION INTRAVENOUS; SUBCUTANEOUS at 16:35

## 2025-05-07 RX ADMIN — ENOXAPARIN SODIUM 40 MILLIGRAM(S): 100 INJECTION SUBCUTANEOUS at 06:04

## 2025-05-07 RX ADMIN — Medication 2 TABLET(S): at 04:25

## 2025-05-07 RX ADMIN — INSULIN LISPRO 2 UNIT(S): 100 INJECTION, SOLUTION INTRAVENOUS; SUBCUTANEOUS at 08:23

## 2025-05-07 RX ADMIN — MUPIROCIN CALCIUM 1 APPLICATION(S): 20 CREAM TOPICAL at 06:04

## 2025-05-07 RX ADMIN — ATORVASTATIN CALCIUM 20 MILLIGRAM(S): 80 TABLET, FILM COATED ORAL at 21:36

## 2025-05-07 RX ADMIN — Medication 1 APPLICATION(S): at 06:04

## 2025-05-07 RX ADMIN — INSULIN LISPRO 1: 100 INJECTION, SOLUTION INTRAVENOUS; SUBCUTANEOUS at 01:58

## 2025-05-07 RX ADMIN — INSULIN GLARGINE-YFGN 10 UNIT(S): 100 INJECTION, SOLUTION SUBCUTANEOUS at 12:42

## 2025-05-07 RX ADMIN — Medication 1 LOZENGE: at 03:02

## 2025-05-07 NOTE — DISCHARGE NOTE PROVIDER - CARE PROVIDER_API CALL
Arben Cardoza  Endocrinology/Metab/Diabetes  [address] [facility] [address]  Phone: [phone]  Fax: [phone]  Established Patient  Follow Up Time: 1 week    RIGOBERTO DIAZ  [address] [place] [address], [address] [address]  Phone: [phone]  Fax: [phone]  Established Patient  Follow Up Time: 2 weeks   Arben Cardoza  Endocrinology/Metab/Diabetes  [address] [facility] [place] [address]  Phone: [phone]  Fax: [phone]  Established Patient  Scheduled Appointment: 06/19/2025    RIGOBERTO DIAZ  [address] [place] [address], [address] [address]  Phone: [phone]  Fax: [phone]  Established Patient  Follow Up Time: 2 weeks

## 2025-05-07 NOTE — DISCHARGE NOTE PROVIDER - NSDCCPTREATMENT_GEN_ALL_CORE_FT
PRINCIPAL PROCEDURE  Procedure: XR chest, 1 view  Findings and Treatment: FINDINGS:  Right upper quadrant surgical clips.  The heart size is normal.  The lungs are clear. Normal pulmonary vasculature  There is no pneumothorax, pneumomediastinum or pneumoperitoneum or   pleural effusion.  No acute osseous findings  IMPRESSION:  No pneumothorax, pneumomediastinum or pneumoperitoneum, history of nausea   and vomiting  Clear lungs.

## 2025-05-07 NOTE — DISCHARGE NOTE PROVIDER - ATTENDING DISCHARGE PHYSICAL EXAMINATION:
pt seen and examined by me  feeling better  ready for dc today  VSS  CHEST b/l AE  EXT no cce  a/w DKA, now resolved  medically stable for dc

## 2025-05-07 NOTE — PROGRESS NOTE ADULT - ATTENDING COMMENTS
70 F with CVA no deficits on plavix, DM on metformin/SGLT2 recently, on ozempic here with n/v, found to have HAGMA/ALMA/lactic acidosis due to DKA    on insulin gtt, q1h finger sticks  c/w IVF  monitor for hypokalemia and hypophosphatemia, insulin gtt had to be held overnight due to hypokalemia  will follow DKA protocol, endocrine consult  transition to basal/bolus today  c/w plavix  no urinary symptoms, monitor off abx
70 F with CVA no deficits on plavix, DM on metformin/SGLT2 recently, on ozempic here with n/v, found to have HAGMA/ALMA/lactic acidosis due to DKA    transitioned to basal/bolus yesterday, BHB slightly increased but resolved overnight  monitor off IVF  monitor for hypokalemia and hypophosphatemia  c/w plavix  no urinary symptoms, monitor off abx  f/u endocrine
70 F with CVA no deficits on plavix, DM on metformin/SGLT2 recently, on ozempic here with n/v, found to have HAGMA/ALMA/lactic acidosis due to DKA    on insulin gtt, q1h finger sticks  c/w IVF  monitor for hypokalemia and hypophosphatemia, insulin gtt had to be held overnight due to hypokalemia  will follow DKA protocol, endocrine consult  c/w plavix  no urinary symptoms, monitor off abx     full code per patient  lvsf normal

## 2025-05-07 NOTE — PROGRESS NOTE ADULT - SUBJECTIVE AND OBJECTIVE BOX
Patient is a 70y old  Female who presents with a chief complaint of Dizziness/ NV (06 May 2025 15:34)      SUBJECTIVE / OVERNIGHT EVENTS:  24hrs events    Pt seen and examined at bedside. Had no complaints. Denied cp, sob, d/n/v    MEDICATIONS  (STANDING):  atorvastatin 20 milliGRAM(s) Oral at bedtime  chlorhexidine 2% Cloths 1 Application(s) Topical <User Schedule>  clopidogrel Tablet 75 milliGRAM(s) Oral every 24 hours  dextrose 5%. 1000 milliLiter(s) (50 mL/Hr) IV Continuous <Continuous>  dextrose 5%. 1000 milliLiter(s) (100 mL/Hr) IV Continuous <Continuous>  dextrose 50% Injectable 50 milliLiter(s) IV Push every 15 minutes  enoxaparin Injectable 40 milliGRAM(s) SubCutaneous every 24 hours  glucagon  Injectable 1 milliGRAM(s) IntraMuscular once  insulin glargine Injectable (LANTUS) 8 Unit(s) SubCutaneous <User Schedule>  insulin lispro (ADMELOG) corrective regimen sliding scale   SubCutaneous three times a day before meals  insulin lispro Injectable (ADMELOG) 2 Unit(s) SubCutaneous three times a day before meals  multivitamin 1 Tablet(s) Oral daily  mupirocin 2% Nasal 1 Application(s) Both Nostrils two times a day    MEDICATIONS  (PRN):  albuterol    90 MICROgram(s) HFA Inhaler 2 Puff(s) Inhalation every 6 hours PRN for shortness of breath and/or wheezing  benzocaine/menthol Lozenge 1 Lozenge Oral every 2 hours PRN Sore Throat  dextrose Oral Gel 15 Gram(s) Oral once PRN Blood Glucose LESS THAN 70 milliGRAM(s)/deciliter      CAPILLARY BLOOD GLUCOSE      POCT Blood Glucose.: 189 mg/dL (07 May 2025 01:56)  POCT Blood Glucose.: 158 mg/dL (06 May 2025 21:57)  POCT Blood Glucose.: 154 mg/dL (06 May 2025 18:22)  POCT Blood Glucose.: 162 mg/dL (06 May 2025 16:37)  POCT Blood Glucose.: 152 mg/dL (06 May 2025 15:15)  POCT Blood Glucose.: 149 mg/dL (06 May 2025 14:05)  POCT Blood Glucose.: 164 mg/dL (06 May 2025 13:16)  POCT Blood Glucose.: 178 mg/dL (06 May 2025 12:09)  POCT Blood Glucose.: 161 mg/dL (06 May 2025 11:15)  POCT Blood Glucose.: 145 mg/dL (06 May 2025 10:10)  POCT Blood Glucose.: 140 mg/dL (06 May 2025 09:06)  POCT Blood Glucose.: 139 mg/dL (06 May 2025 08:01)    I&O's Summary    06 May 2025 07:01  -  07 May 2025 07:00  --------------------------------------------------------  IN: 4450 mL / OUT: 4500 mL / NET: -50 mL        Vital Signs Last 24 Hrs  T(C): 37.1 (07 May 2025 04:00), Max: 37.1 (06 May 2025 20:00)  T(F): 98.8 (07 May 2025 04:00), Max: 98.8 (06 May 2025 20:00)  HR: 97 (07 May 2025 04:00) (81 - 104)  BP: 101/77 (07 May 2025 04:00) (101/77 - 136/53)  BP(mean): 85 (07 May 2025 04:00) (74 - 88)  RR: 19 (07 May 2025 04:00) (15 - 21)  SpO2: 100% (07 May 2025 04:00) (98% - 100%)    Parameters below as of 07 May 2025 04:00  Patient On (Oxygen Delivery Method): room air        Physical Exam  CONSTITUTIONAL: NAD  EYES: EOMI, conjunctiva and sclera clear  ENMT: Moist oral mucosa  NECK: Supple  RESPIRATORY: Breathing unlabored, CTAB  CARDIOVASCULAR: S1S2 no MRG  ABDOMEN: Nontender to palpation, normoactive bowel sounds, no rebound/guarding  MUSCULOSKELETAL: no clubbing or cyanosis of digits  NEUROLOGY: No focal deficits   SKIN: No rashes or lesions    LABS:                        12.4   6.39  )-----------( 214      ( 07 May 2025 02:00 )             36.2      05-07    139  |  107  |  <2[L]  ----------------------------<  194[H]  4.1   |  22  |  0.45[L]    Ca    7.5[L]      07 May 2025 02:00  Phos  2.1     05-07  Mg     2.00     05-07    TPro  x   /  Alb  x   /  TBili  0.5  /  DBili  x   /  AST  x   /  ALT  x   /  AlkPhos  x   05-06    PT/INR - ( 07 May 2025 02:00 )   PT: 10.8 sec;   INR: 0.91 ratio         PTT - ( 07 May 2025 02:00 )  PTT:25.6 sec      Urinalysis Basic - ( 07 May 2025 02:00 )    Color: x / Appearance: x / SG: x / pH: x  Gluc: 194 mg/dL / Ketone: x  / Bili: x / Urobili: x   Blood: x / Protein: x / Nitrite: x   Leuk Esterase: x / RBC: x / WBC x   Sq Epi: x / Non Sq Epi: x / Bacteria: x        RADIOLOGY & ADDITIONAL TESTS:    Imaging Personally Reviewed:    Consultant(s) Notes Reviewed:      Care Discussed with Consultants/Other Providers:   Patient is a 70y old  Female who presents with a chief complaint of Dizziness/ NV (06 May 2025 15:34)      SUBJECTIVE / OVERNIGHT EVENTS:  24hrs events  - insulin gtt discontinued  - started on insulin injection and diet    Pt seen and examined at bedside. Had no complaints. Denied cp, sob, d/n/v    MEDICATIONS  (STANDING):  atorvastatin 20 milliGRAM(s) Oral at bedtime  chlorhexidine 2% Cloths 1 Application(s) Topical <User Schedule>  clopidogrel Tablet 75 milliGRAM(s) Oral every 24 hours  dextrose 5%. 1000 milliLiter(s) (50 mL/Hr) IV Continuous <Continuous>  dextrose 5%. 1000 milliLiter(s) (100 mL/Hr) IV Continuous <Continuous>  dextrose 50% Injectable 50 milliLiter(s) IV Push every 15 minutes  enoxaparin Injectable 40 milliGRAM(s) SubCutaneous every 24 hours  glucagon  Injectable 1 milliGRAM(s) IntraMuscular once  insulin glargine Injectable (LANTUS) 8 Unit(s) SubCutaneous <User Schedule>  insulin lispro (ADMELOG) corrective regimen sliding scale   SubCutaneous three times a day before meals  insulin lispro Injectable (ADMELOG) 2 Unit(s) SubCutaneous three times a day before meals  multivitamin 1 Tablet(s) Oral daily  mupirocin 2% Nasal 1 Application(s) Both Nostrils two times a day    MEDICATIONS  (PRN):  albuterol    90 MICROgram(s) HFA Inhaler 2 Puff(s) Inhalation every 6 hours PRN for shortness of breath and/or wheezing  benzocaine/menthol Lozenge 1 Lozenge Oral every 2 hours PRN Sore Throat  dextrose Oral Gel 15 Gram(s) Oral once PRN Blood Glucose LESS THAN 70 milliGRAM(s)/deciliter      CAPILLARY BLOOD GLUCOSE      POCT Blood Glucose.: 189 mg/dL (07 May 2025 01:56)  POCT Blood Glucose.: 158 mg/dL (06 May 2025 21:57)  POCT Blood Glucose.: 154 mg/dL (06 May 2025 18:22)  POCT Blood Glucose.: 162 mg/dL (06 May 2025 16:37)  POCT Blood Glucose.: 152 mg/dL (06 May 2025 15:15)  POCT Blood Glucose.: 149 mg/dL (06 May 2025 14:05)  POCT Blood Glucose.: 164 mg/dL (06 May 2025 13:16)  POCT Blood Glucose.: 178 mg/dL (06 May 2025 12:09)  POCT Blood Glucose.: 161 mg/dL (06 May 2025 11:15)  POCT Blood Glucose.: 145 mg/dL (06 May 2025 10:10)  POCT Blood Glucose.: 140 mg/dL (06 May 2025 09:06)  POCT Blood Glucose.: 139 mg/dL (06 May 2025 08:01)    I&O's Summary    06 May 2025 07:01  -  07 May 2025 07:00  --------------------------------------------------------  IN: 4450 mL / OUT: 4500 mL / NET: -50 mL        Vital Signs Last 24 Hrs  T(C): 37.1 (07 May 2025 04:00), Max: 37.1 (06 May 2025 20:00)  T(F): 98.8 (07 May 2025 04:00), Max: 98.8 (06 May 2025 20:00)  HR: 97 (07 May 2025 04:00) (81 - 104)  BP: 101/77 (07 May 2025 04:00) (101/77 - 136/53)  BP(mean): 85 (07 May 2025 04:00) (74 - 88)  RR: 19 (07 May 2025 04:00) (15 - 21)  SpO2: 100% (07 May 2025 04:00) (98% - 100%)    Parameters below as of 07 May 2025 04:00  Patient On (Oxygen Delivery Method): room air        Physical Exam  CONSTITUTIONAL: NAD  EYES: EOMI, conjunctiva and sclera clear  ENMT: Moist oral mucosa  NECK: Supple  RESPIRATORY: Breathing unlabored, CTAB  CARDIOVASCULAR: S1S2 no MRG  ABDOMEN: Nontender to palpation, normoactive bowel sounds, no rebound/guarding  MUSCULOSKELETAL: no clubbing or cyanosis of digits  NEUROLOGY: No focal deficits   SKIN: No rashes or lesions    LABS:                        12.4   6.39  )-----------( 214      ( 07 May 2025 02:00 )             36.2      05-07    139  |  107  |  <2[L]  ----------------------------<  194[H]  4.1   |  22  |  0.45[L]    Ca    7.5[L]      07 May 2025 02:00  Phos  2.1     05-07  Mg     2.00     05-07    TPro  x   /  Alb  x   /  TBili  0.5  /  DBili  x   /  AST  x   /  ALT  x   /  AlkPhos  x   05-06    PT/INR - ( 07 May 2025 02:00 )   PT: 10.8 sec;   INR: 0.91 ratio         PTT - ( 07 May 2025 02:00 )  PTT:25.6 sec      Urinalysis Basic - ( 07 May 2025 02:00 )    Color: x / Appearance: x / SG: x / pH: x  Gluc: 194 mg/dL / Ketone: x  / Bili: x / Urobili: x   Blood: x / Protein: x / Nitrite: x   Leuk Esterase: x / RBC: x / WBC x   Sq Epi: x / Non Sq Epi: x / Bacteria: x        RADIOLOGY & ADDITIONAL TESTS:    Imaging Personally Reviewed:    Consultant(s) Notes Reviewed:      Care Discussed with Consultants/Other Providers:

## 2025-05-07 NOTE — CHART NOTE - NSCHARTNOTEFT_GEN_A_CORE
MAR Accept Note      Transfer to: Medicine  Accepting Attending Physician: Eleuterio Lion  Assigned Room: 5S 550A    Patient seen and examined.   Labs and data reviewed.   No findings precluding transfer of service.       HPI/ICU COURSE:   Please refer to MICU transfer note for full details. Briefly, this is a 70F with history of hypertension, CVA (on clopidogrel), type 2 diabetes mellitus with mild proliferative diabetic retinopathy, osteoporosis, and multiple pulmonary nodules presenting with multiple episodes of non-bilious, non-bloody emesis, Kussmaul respirations, and lethargy. Recently started on Synjardy (metformin/empagliflozin) 3 weeks ago and had Ozempic dose increased a few days prior to admission. Initial labs showed pH 6.9, bicarbonate 8, and blood glucose in the 300s with positive urinary ketones, consistent with euglycemic diabetic ketoacidosis (DKA). Treated in MICU with insulin drip per DKA protocol, fluid resuscitation, and electrolyte repletion with resolution of anion gap and acidemia. Transitioned to basal-bolus insulin with Lantus 10 units daily and Admelog 3 units with meals. Endocrinology recommended discontinuing Synjardy and holding Ozempic, with outpatient transition to metformin 1000mg BID. Patient's diabetes control was previously suboptimal with HbA1c of 8.6%. Discharge plan includes follow-up with primary care, endocrinology, podiatry, and ophthalmology, with monitoring of home blood glucose levels and C-peptide testing.      FOR FOLLOW-UP:  [ ] F/u Endocrine recs: pt will need to stop Jardiance (synjardy) and ozempic outpt, obtain cpeptide, will need dc atleast on basal insulin+oral based on inpt trend  [ ] F/u cpeptide, anti ISLET, anti ALISSA, zn transporter 8, IA -2 antibody   [ ] Follow up with Dr. Cardoza (scheduled for 6/19/25)  [ ] Routine follow-up with Ophthalmology and Podiatry   [ ] Outpatient followup for hx of pancreatic cyst and multiple pulmonary nodules.

## 2025-05-07 NOTE — DISCHARGE NOTE PROVIDER - NSDCFUADDAPPT_GEN_ALL_CORE_FT
APPTS ARE READY TO BE MADE: [X] YES    Best Family or Patient Contact (if needed): Self, son    Additional Information about above appointments (if needed):    1: Endocrinology  2: PCP  3: Podiatry  4: Ophthalmology    Other comments or requests:    APPTS ARE READY TO BE MADE: [X] YES    Best Family or Patient Contact (if needed): Self, son    Additional Information about above appointments (if needed):    1: PCP  2: ophthalmology  3: Podiatry      Other comments or requests:    APPTS ARE READY TO BE MADE: [X] YES    Best Family or Patient Contact (if needed): Self, son    Additional Information about above appointments (if needed):    1: PCP  2: ophthalmology  3: Podiatry      Other comments or requests:       Patient was outreached but did not answer. A voicemail was left for the patient to return our call.   APPTS ARE READY TO BE MADE: [X] YES    Best Family or Patient Contact (if needed): Self, son    Additional Information about above appointments (if needed):    1: PCP  2: ophthalmology  3: Podiatry    Patient advised they did not want to proceed with scheduling appointments with the providers on their referrals. They will coordinate care on their own.   Other comments or requests:    APPTS ARE READY TO BE MADE: [X] YES    Best Family or Patient Contact (if needed): Self, son    Additional Information about above appointments (if needed):    1: PCP  2: ophthalmology  3: Podiatry      Other comments or requests:     Prior to outreaching the patient, it was visible that the patient has secured a follow up appointment which was not scheduled by our team. Patient is scheduled to see Dr. Cardoza on 6/19.    Patient advised they did not want to proceed with scheduling appointments with the providers on their referrals. They will coordinate care on their own.

## 2025-05-07 NOTE — DISCHARGE NOTE PROVIDER - HOSPITAL COURSE
For full details, please see H&P, progress notes, consult notes and ancillary notes.    Hospital Course:  70yoF PMH HTN, CVA (on plavix monotherapy), T2DM c/b mild proliferative diabetic retinopathy (non-insulin dependent) recently placed on SYnjardy (metformin/empagliflozin) 3 weeks ago, & increased on her ozempic a few days, ago, gastritis, pancreatic cyst, and multiple pulmonary nodules p/w multiple episodes of nbnb emesis that started 1 day ago. Pt's son states that he noticed she was beginning to have symptoms on Thursday. That same day, she experienced some nausea and vomited. The family contacted her PCP who notified her that some of these symptoms may be due to the dose increase of her Ozempic from 0.5-1. She has had fatigue and GI symptoms since starting Ozempic. On Friday, she felt better. Then on Saturday, she began to have recurrent bouts of emesis, nausea, and difficulties with breathing. Pt denies abd pain, fevers, SOB, diarrhea, constipation, sick contacts, chest pain. Review of systems + for polydypsia and back pain.  MICU consulted for evaluation of severe abdominal deficits and c/f  DKA/dehydration    ED course: VS: T 97.3, , /71, RR 18, SpO2 96% RA. PE there positive for lethargy and Kussmaul respirations. In the ED noted to have pH 6.9, Bicarb 8. BS 300s  Treated w/ fluids and bicarb. Admitted to MICU for insulin drip for treatment of euglycemic DKA on 5/4.    MICU Course: Pt received insulin per DKA protocol, as well as fluid resuscitation + lyte repletion. Endocrine was consulted for further assistance with management of DKA and to evaluate medication regimen. Pt was recommended to DC Synjardy and hold Ozempic until endocrine follow up appointment and that the patient be discharged on insulin w/ c-peptide testing. On 5/6, the patient's AG closed and the patient was converted to basal bolus 8 lantus 3 premeal. The patient is now stable for discharge with close follow up with her PCP and Endocrinologist.    On day of discharge, patient is clinically stable with no new exam findings or acute symptoms compared to prior. The patient was seen by the attending physician on the date of discharge and deemed stable and acceptable for discharge. The patient's chronic medical conditions were treated accordingly per the patient's home medication regimen. The patient's medication reconciliation (with changes made to chronic medications), follow up appointments, discharge orders, instructions, and significant lab and diagnostic studies are as noted.     Discharge follow up action items:     1. Follow up with PCP in 1-2 weeks. Follow up with Endocrinology subspecialists in 1-2 weeks.  2. Follow up labs: c-peptide  3. Medication changes:     - STOP Synjardy   - STOP Ozempic  - START Metformin 1000mg BID  - START 8u Lantus at bed time daily    4. On hold medications: Synjardy, Ozempic  5. Incidental findings: n/a  6. Diagnoses: DKA    Patient's ordered code status: [X] Full code  [ ] DNR  [ ] Hospice  Patient disposition: Home    Patient will be discharged to home with close follow up.   For full details, please see H&P, progress notes, consult notes and ancillary notes.    Hospital Course:  70yoF PMH HTN, CVA (on plavix monotherapy), T2DM c/b mild proliferative diabetic retinopathy (non-insulin dependent) recently placed on SYnjardy (metformin/empagliflozin) 3 weeks ago, & increased on her ozempic a few days, ago, gastritis, pancreatic cyst, and multiple pulmonary nodules p/w multiple episodes of nbnb emesis that started 1 day ago. Pt's son states that he noticed she was beginning to have symptoms on Thursday. That same day, she experienced some nausea and vomited. The family contacted her PCP who notified her that some of these symptoms may be due to the dose increase of her Ozempic from 0.5-1. She has had fatigue and GI symptoms since starting Ozempic. On Friday, she felt better. Then on Saturday, she began to have recurrent bouts of emesis, nausea, and difficulties with breathing. Pt denies abd pain, fevers, SOB, diarrhea, constipation, sick contacts, chest pain. Review of systems + for polydypsia and back pain.  MICU consulted for evaluation of severe abdominal deficits and c/f  DKA/dehydration    ED course: VS: T 97.3, , /71, RR 18, SpO2 96% RA. PE there positive for lethargy and Kussmaul respirations. In the ED noted to have pH 6.9, Bicarb 8. BS 300s  Treated w/ fluids and bicarb. Admitted to MICU for insulin drip for treatment of euglycemic DKA on 5/4.    MICU Course: Pt received insulin per DKA protocol, as well as fluid resuscitation + lyte repletion. Endocrine was consulted for further assistance with management of DKA and to evaluate medication regimen. Pt was recommended to DC Synjardy and hold Ozempic until endocrine follow up appointment and that the patient be discharged on insulin w/ c-peptide testing. On 5/6, the patient's AG closed and the patient was converted to basal bolus 8 lantus 3 premeal. The patient is now stable for discharge with close follow up with her PCP and Endocrinologist.    On day of discharge, patient is clinically stable with no new exam findings or acute symptoms compared to prior. The patient was seen by the attending physician on the date of discharge and deemed stable and acceptable for discharge. The patient's chronic medical conditions were treated accordingly per the patient's home medication regimen. The patient's medication reconciliation (with changes made to chronic medications), follow up appointments, discharge orders, instructions, and significant lab and diagnostic studies are as noted.     Discharge follow up action items:     1. Follow up with PCP in 1-2 weeks. Follow up with Endocrinology subspecialists in 1-2 weeks.  2. Follow up labs: c-peptide  3. Medication changes:     - STOP Synjardy   - STOP Ozempic  - START Metformin 1000mg BID  - START 10u Lantus - move it 2hrs each day until at bed time (once daily)    4. On hold medications: Synjardy, Ozempic  5. Incidental findings: n/a  6. Diagnoses: DKA    Patient's ordered code status: [X] Full code  [ ] DNR  [ ] Hospice  Patient disposition: Home    Patient will be discharged to home with close follow up.   For full details, please see H&P, progress notes, consult notes and ancillary notes.    Hospital Course:  70yoF PMH HTN, CVA (on plavix monotherapy), T2DM c/b mild proliferative diabetic retinopathy (non-insulin dependent) recently placed on SYnjardy (metformin/empagliflozin) 3 weeks ago, & increased on her ozempic a few days, ago, gastritis, pancreatic cyst, and multiple pulmonary nodules p/w multiple episodes of nbnb emesis that started 1 day ago. Pt's son states that he noticed she was beginning to have symptoms on Thursday. That same day, she experienced some nausea and vomited. The family contacted her PCP who notified her that some of these symptoms may be due to the dose increase of her Ozempic from 0.5-1. She has had fatigue and GI symptoms since starting Ozempic. On Friday, she felt better. Then on Saturday, she began to have recurrent bouts of emesis, nausea, and difficulties with breathing. Pt denies abd pain, fevers, SOB, diarrhea, constipation, sick contacts, chest pain. Review of systems + for polydypsia and back pain.  MICU consulted for evaluation of severe abdominal deficits and c/f  DKA/dehydration    ED course: VS: T 97.3, , /71, RR 18, SpO2 96% RA. PE there positive for lethargy and Kussmaul respirations. In the ED noted to have pH 6.9, Bicarb 8. BS 300s  Treated w/ fluids and bicarb. Admitted to MICU for insulin drip for treatment of euglycemic DKA on 5/4.    MICU Course: Pt received insulin per DKA protocol, as well as fluid resuscitation + lyte repletion. Endocrine was consulted for further assistance with management of DKA and to evaluate medication regimen. Pt was recommended to DC Synjardy and hold Ozempic until endocrine follow up appointment and that the patient be discharged on insulin w/ c-peptide testing. On 5/6, the patient's AG closed and the patient was converted to basal bolus 8 lantus 3 premeal. The patient is now stable for discharge with close follow up with her PCP, Endocrinologist, podiatrist, ophthalmologist    On day of discharge, patient is clinically stable with no new exam findings or acute symptoms compared to prior. The patient was seen by the attending physician on the date of discharge and deemed stable and acceptable for discharge. The patient's chronic medical conditions were treated accordingly per the patient's home medication regimen. The patient's medication reconciliation (with changes made to chronic medications), follow up appointments, discharge orders, instructions, and significant lab and diagnostic studies are as noted.     Discharge follow up action items:     1. Follow up with PCP in 1-2 weeks. Follow up with Endocrinology, Podiatry, ophthalmology subspecialists in 1-2 weeks.  2. Follow up labs: c-peptide  3. Medication changes:     - STOP Synjardy   - STOP Ozempic  - START Metformin 1000mg BID  - START 10u Lantus - move it 2hrs each day until at bed time (once daily)    4. On hold medications: Synjardy, Ozempic  5. Incidental findings: n/a  6. Diagnoses: DKA    Patient's ordered code status: [X] Full code  [ ] DNR  [ ] Hospice  Patient disposition: Home    Patient will be discharged to home with close follow up.   For full details, please see H&P, progress notes, consult notes and ancillary notes.    Hospital Course:  70yoF PMH HTN, CVA (on plavix monotherapy), T2DM c/b mild proliferative diabetic retinopathy (non-insulin dependent) recently placed on SYnjardy (metformin/empagliflozin) 3 weeks ago, & increased on her ozempic a few days, ago, gastritis, pancreatic cyst, and multiple pulmonary nodules p/w multiple episodes of nbnb emesis that started 1 day ago. Pt's son states that he noticed she was beginning to have symptoms on Thursday. That same day, she experienced some nausea and vomited. The family contacted her PCP who notified her that some of these symptoms may be due to the dose increase of her Ozempic from 0.5-1. She has had fatigue and GI symptoms since starting Ozempic. On Friday, she felt better. Then on Saturday, she began to have recurrent bouts of emesis, nausea, and difficulties with breathing. Pt denies abd pain, fevers, SOB, diarrhea, constipation, sick contacts, chest pain. Review of systems + for polydypsia and back pain.  MICU consulted for evaluation of severe abdominal deficits and c/f  DKA/dehydration    ED course: VS: T 97.3, , /71, RR 18, SpO2 96% RA. PE there positive for lethargy and Kussmaul respirations. In the ED noted to have pH 6.9, Bicarb 8. BS 300s  Treated w/ fluids and bicarb. Admitted to MICU for insulin drip for treatment of euglycemic DKA on 5/4.    MICU Course: Pt received insulin per DKA protocol, as well as fluid resuscitation + lyte repletion. Endocrine was consulted for further assistance with management of DKA and to evaluate medication regimen. Pt was recommended to DC Synjardy and hold Ozempic until endocrine follow up appointment and that the patient be discharged on insulin w/ c-peptide testing. On 5/6, the patient's AG closed and the patient was converted to basal bolus 8 lantus 3 premeal, subsequently adjusted to 10 u lantus. The patient is now stable for discharge with close follow up with her PCP, Endocrinologist, podiatrist, ophthalmologist    On day of discharge, patient is clinically stable with no new exam findings or acute symptoms compared to prior. The patient was seen by the attending physician on the date of discharge and deemed stable and acceptable for discharge. The patient's chronic medical conditions were treated accordingly per the patient's home medication regimen. The patient's medication reconciliation (with changes made to chronic medications), follow up appointments, discharge orders, instructions, and significant lab and diagnostic studies are as noted.     Discharge follow up action items:     1. Follow up with PCP in 1-2 weeks. Follow up with Endocrinology, Podiatry, ophthalmology subspecialists in 1-2 weeks.  2. Follow up labs: c-peptide  3. Medication changes:     - STOP Synjardy   - STOP Ozempic  - START Metformin 1000mg BID  - START 10u Lantus - move it 2hrs each day until at bed time (once daily)    4. On hold medications: Synjardy, Ozempic  5. Incidental findings: n/a  6. Diagnoses: DKA    Patient's ordered code status: [X] Full code  [ ] DNR  [ ] Hospice  Patient disposition: Home    Patient will be discharged to home with close follow up.

## 2025-05-07 NOTE — DISCHARGE NOTE PROVIDER - NSFOLLOWUPCLINICS_GEN_ALL_ED_FT
[facility]  Podiatry  [address] - [address] [place] [address]  Phone: [phone]  Fax:   Follow Up Time: 1 month    [facility]  Ophthalmology  [address] [place] [address] [address]  [place] [address]  Phone: [phone]  Fax:   Follow Up Time: 1 month

## 2025-05-07 NOTE — DISCHARGE NOTE PROVIDER - NSDCMRMEDTOKEN_GEN_ALL_CORE_FT
alendronate 70 mg oral tablet: 1 tab(s) orally once a week  atorvastatin 20 mg oral tablet: 1 tab(s) orally once a day  Basaglar KwikPen 100 units/mL subcutaneous solution: 8 unit(s) subcutaneous once a day (at bedtime)  clopidogrel 75 mg oral tablet: 1 tab(s) orally once a day  DilTIAZem Hydrochloride  mg/24 hours oral capsule, extended release: 1 cap(s) orally once a day  ferrous gluconate 325 mg (36 mg elemental iron) oral tablet: orally once a day  Flonase Allergy Relief 50 mcg/inh nasal spray: 1 spray(s) in each nostril 2 times a day  hydroCHLOROthiazide 12.5 mg oral tablet: 1 tab(s) orally once a day  losartan 50 mg oral tablet: 1 tab(s) orally once a day  MetFORMIN (Eqv-Fortamet) 500 mg oral tablet, extended release: 2 tab(s) orally 2 times a day  Multiple Vitamins oral tablet: 1 tab(s) orally once a day  pantoprazole 40 mg oral delayed release tablet: 1 tab(s) orally once a day   alcohol swabs : Apply topically to affected area 4 times a day  alendronate 70 mg oral tablet: 1 tab(s) orally once a week  atorvastatin 20 mg oral tablet: 1 tab(s) orally once a day  Basaglar KwikPen 100 units/mL subcutaneous solution: 8 unit(s) subcutaneous once a day (at bedtime)  clopidogrel 75 mg oral tablet: 1 tab(s) orally once a day  Dexcom G7 : Use as directed  Dexcom G7 Sensors: Change every 10 days.  DilTIAZem Hydrochloride  mg/24 hours oral capsule, extended release: 1 cap(s) orally once a day  ferrous gluconate 325 mg (36 mg elemental iron) oral tablet: orally once a day  Flonase Allergy Relief 50 mcg/inh nasal spray: 1 spray(s) in each nostril 2 times a day  glucometer (per patient&#x27;s insurance): Test blood sugars four times a day. Dispense #1 glucometer.  hydroCHLOROthiazide 12.5 mg oral tablet: 1 tab(s) orally once a day  Insulin Pen Needles, 4mm: 1 application subcutaneously 4 times a day. ** Use with insulin pen **  lancets: 1 application subcutaneously 4 times a day  losartan 50 mg oral tablet: 1 tab(s) orally once a day  MetFORMIN (Eqv-Fortamet) 500 mg oral tablet, extended release: 2 tab(s) orally 2 times a day  Multiple Vitamins oral tablet: 1 tab(s) orally once a day  pantoprazole 40 mg oral delayed release tablet: 1 tab(s) orally once a day  test strips (per patient&#x27;s insurance): 1 application subcutaneously 4 times a day. ** Compatible with patient&#x27;s glucometer **   alcohol swabs : Apply topically to affected area 4 times a day  alendronate 70 mg oral tablet: 1 tab(s) orally once a week  atorvastatin 20 mg oral tablet: 1 tab(s) orally once a day  Basaglar KwikPen 100 units/mL subcutaneous solution: 10 unit(s) subcutaneous once a day (at bedtime)  clopidogrel 75 mg oral tablet: 1 tab(s) orally once a day  Dexcom G7 : Use as directed  Dexcom G7 Sensors: Change every 10 days.  DilTIAZem Hydrochloride  mg/24 hours oral capsule, extended release: 1 cap(s) orally once a day  ferrous gluconate 325 mg (36 mg elemental iron) oral tablet: orally once a day  Flonase Allergy Relief 50 mcg/inh nasal spray: 1 spray(s) in each nostril 2 times a day  glucometer (per patient&#x27;s insurance): Test blood sugars four times a day. Dispense #1 glucometer.  hydroCHLOROthiazide 12.5 mg oral tablet: 1 tab(s) orally once a day  Insulin Pen Needles, 4mm: 1 application subcutaneously 4 times a day. ** Use with insulin pen **  lancets: 1 application subcutaneously 4 times a day  losartan 50 mg oral tablet: 1 tab(s) orally once a day  MetFORMIN (Eqv-Fortamet) 500 mg oral tablet, extended release: 2 tab(s) orally 2 times a day  Multiple Vitamins oral tablet: 1 tab(s) orally once a day  pantoprazole 40 mg oral delayed release tablet: 1 tab(s) orally once a day  test strips (per patient&#x27;s insurance): 1 application subcutaneously 4 times a day. ** Compatible with patient&#x27;s glucometer **   alcohol swabs : Apply topically to affected area 4 times a day  alendronate 70 mg oral tablet: 1 tab(s) orally once a week  atorvastatin 20 mg oral tablet: 1 tab(s) orally once a day  Basaglar KwikPen 100 units/mL subcutaneous solution: 10 unit(s) subcutaneous once a day (at bedtime)  clopidogrel 75 mg oral tablet: 1 tab(s) orally once a day  Dexcom G7 : Use as directed  Dexcom G7 Sensors: Change every 10 days.  DilTIAZem Hydrochloride  mg/24 hours oral capsule, extended release: 1 cap(s) orally once a day  ferrous gluconate 325 mg (36 mg elemental iron) oral tablet: orally once a day  Flonase Allergy Relief 50 mcg/inh nasal spray: 1 spray(s) in each nostril 2 times a day  glucometer (per patient&#x27;s insurance): Test blood sugars four times a day. Dispense #1 glucometer.  Insulin Pen Needles, 4mm: 1 application subcutaneously 4 times a day. ** Use with insulin pen **  lancets: 1 application subcutaneously 4 times a day  MetFORMIN (Eqv-Fortamet) 500 mg oral tablet, extended release: 2 tab(s) orally 2 times a day  Multiple Vitamins oral tablet: 1 tab(s) orally once a day  pantoprazole 40 mg oral delayed release tablet: 1 tab(s) orally once a day  test strips (per patient&#x27;s insurance): 1 application subcutaneously 4 times a day. ** Compatible with patient&#x27;s glucometer **

## 2025-05-07 NOTE — DISCHARGE NOTE PROVIDER - PROVIDER TOKENS
PROVIDER:[TOKEN:[[id number]],FOLLOWUP:[1 week],ESTABLISHEDPATIENT:[T]],PROVIDER:[TOKEN:[[id number]],FOLLOWUP:[2 weeks],ESTABLISHEDPATIENT:[T]] PROVIDER:[TOKEN:[[id number]],SCHEDULEDAPPT:[06/19/2025],ESTABLISHEDPATIENT:[T]],PROVIDER:[TOKEN:[[id number]],FOLLOWUP:[2 weeks],ESTABLISHEDPATIENT:[T]]

## 2025-05-07 NOTE — DISCHARGE NOTE PROVIDER - NSDCCPCAREPLAN_GEN_ALL_CORE_FT
PRINCIPAL DISCHARGE DIAGNOSIS  Diagnosis: DKA (diabetic ketoacidosis)  Assessment and Plan of Treatment: You came to the hospital because you were vomiting and having trouble breathing. You recently started a new diabetes medicine (Synjardy) and increased the dose of another (Ozempic). You have high blood pressure, diabetes, and some other medical conditions.  Tests in the Emergency Room showed that your body was very acidic (DKA), likely due to the changes in your diabetes medications, even though your blood sugar wasn't extremely high.  You were also dehydrated. You were given fluids and medication to help correct this.  You were admitted to the intensive care unit (MICU) and given insulin and fluids. A diabetes specialist (endocrinologist) recommended stopping Synjardy and Ozempic.  You will be going home on insulin injections and will need a blood test (c-peptide) done as outpatient  You should follow up with your primary care doctor and endocrinologist very soon.  If you experience nausea, vomiting, difficulty breathing, excessive thirst, or any other concerning symptoms, please return to the hospital immediately. It’s very important to take your medications exactly as prescribed and keep all scheduled appointments.   Please take medications as instructed below:  - STOP Synjardy   - STOP Ozempic  - START Metformin 1000mg BID  - START 8u Lantus at bed time daily       PRINCIPAL DISCHARGE DIAGNOSIS  Diagnosis: DKA (diabetic ketoacidosis)  Assessment and Plan of Treatment: You came to the hospital because you were vomiting and having trouble breathing. You recently started a new diabetes medicine (Synjardy) and increased the dose of another (Ozempic). You have high blood pressure, diabetes, and some other medical conditions.  Tests in the Emergency Room showed that your body was very acidic (DKA), likely due to the changes in your diabetes medications, even though your blood sugar wasn't extremely high.  You were also dehydrated. You were given fluids and medication to help correct this.  You were admitted to the intensive care unit (MICU) and given insulin and fluids. A diabetes specialist (endocrinologist) recommended stopping Synjardy and Ozempic.  You will be going home on insulin injections and will need a blood test (c-peptide) done as outpatient  You should follow up with your primary care doctor and endocrinologist very soon.  If you experience nausea, vomiting, difficulty breathing, excessive thirst, or any other concerning symptoms, please return to the hospital immediately. It’s very important to take your medications exactly as prescribed and keep all scheduled appointments.   Please take medications as instructed below:  - STOP Synjardy   - STOP Ozempic  - START Metformin 1000mg BID  - START 10u Lantus - move it 2hrs each day until at bed time (once daily)       PRINCIPAL DISCHARGE DIAGNOSIS  Diagnosis: DKA (diabetic ketoacidosis)  Assessment and Plan of Treatment: You came to the hospital because you were vomiting and having trouble breathing. You recently started a new diabetes medicine (Synjardy) and increased the dose of another (Ozempic). You have high blood pressure, diabetes, and some other medical conditions.  Tests in the Emergency Room showed that your body was very acidic (DKA), likely due to the changes in your diabetes medications, even though your blood sugar wasn't extremely high.  You were also dehydrated. You were given fluids and medication to help correct this.  You were admitted to the intensive care unit (MICU) and given insulin and fluids. A diabetes specialist (endocrinologist) recommended stopping Synjardy and Ozempic.  You will be going home on insulin injections and will need a blood test (c-peptide) done as outpatient  You should follow up with your primary care doctor, endocrinologist, podiatrist, and ophthalmologist very soon.  If you experience nausea, vomiting, difficulty breathing, excessive thirst, or any other concerning symptoms, please return to the hospital immediately. It’s very important to take your medications exactly as prescribed and keep all scheduled appointments.   You should check FSBG premeals and bedtime. Patient should call their doctor when FSBG <70 or above >400 and or consistently above 200s as changes in the regimen will have to be made.   Please take medications as instructed below:  - STOP Synjardy   - STOP Ozempic  - START Metformin 1000mg BID  - START 10u Lantus - move it 2hrs each day until at bed time (once daily)       PRINCIPAL DISCHARGE DIAGNOSIS  Diagnosis: DKA (diabetic ketoacidosis)  Assessment and Plan of Treatment: You came to the hospital because you were vomiting and having trouble breathing. You recently started a new diabetes medicine (Synjardy) and increased the dose of another (Ozempic). You have high blood pressure, diabetes, and some other medical conditions.  Tests in the Emergency Room showed that your body was very acidic (DKA), likely due to the changes in your diabetes medications, even though your blood sugar wasn't extremely high.  You were also dehydrated. You were given fluids and medication to help correct this.  You were admitted to the intensive care unit (MICU) and given insulin and fluids. A diabetes specialist (endocrinologist) recommended stopping Synjardy and Ozempic.  You will be going home on insulin injections and will need a blood test (c-peptide) done as outpatient  You should follow up with your primary care doctor, endocrinologist, podiatrist, and ophthalmologist very soon.  If you experience nausea, vomiting, difficulty breathing, excessive thirst, or any other concerning symptoms, please return to the hospital immediately. It’s very important to take your medications exactly as prescribed and keep all scheduled appointments.   You should check FSBG premeals and bedtime. Patient should call their doctor when FSBG <70 or above >400 and or consistently above 200s as changes in the regimen will have to be made.   Please take medications as instructed below:  - STOP Synjardy   - STOP Ozempic  - START Metformin 1000mg 2 x day  - START 10u Lantus - move it 2hrs each day until at bed time (once daily)  - Follow up with Dr. Cardoza (scheduled for 6/19/25)  - Routine follow-up with Ophthalmology and Podiatry        PRINCIPAL DISCHARGE DIAGNOSIS  Diagnosis: DKA (diabetic ketoacidosis)  Assessment and Plan of Treatment: You came to the hospital because you were vomiting and having trouble breathing. You recently started a new diabetes medicine (Synjardy) and increased the dose of another (Ozempic). You have high blood pressure, diabetes, and some other medical conditions.  Tests in the Emergency Room showed that your body was very acidic (DKA), likely due to the changes in your diabetes medications, even though your blood sugar wasn't extremely high.  You were also dehydrated. You were given fluids and medication to help correct this.  You were admitted to the intensive care unit (MICU) and given insulin and fluids. A diabetes specialist (endocrinologist) recommended stopping Synjardy and Ozempic.  You will be going home on insulin injections and will need a blood test (c-peptide) done as outpatient  You should follow up with your primary care doctor, endocrinologist, podiatrist, and ophthalmologist very soon.  If you experience nausea, vomiting, difficulty breathing, excessive thirst, or any other concerning symptoms, please return to the hospital immediately. It’s very important to take your medications exactly as prescribed and keep all scheduled appointments.   You should check glucose finger sticks premeals and bedtime. Patient should call their doctor when FSBG <70 or above >400 and or consistently above 200s as changes in the regimen will have to be made.   Please take medications as instructed below:  - STOP Synjardy   - STOP Ozempic  - START Metformin 1000mg 2 x day  - START 10u Lantus - move it 2hrs each day until at bed time (once daily)  - Follow up with Dr. Cardoza (scheduled for 6/19/25)  - Routine follow-up with Ophthalmology and Podiatry         SECONDARY DISCHARGE DIAGNOSES  Diagnosis: Gastritis  Assessment and Plan of Treatment: take your pantoprazole daily; make sure to have food with any ibuprofen/motrin/aleve as this can flare up your gastritis    Diagnosis: Hypophosphatemia  Assessment and Plan of Treatment: Your phophorus was low, likely as part of your DKA with eletrolyte shifts.  This has resolved     PRINCIPAL DISCHARGE DIAGNOSIS  Diagnosis: DKA (diabetic ketoacidosis)  Assessment and Plan of Treatment: You came to the hospital because you were vomiting and having trouble breathing. You recently started a new diabetes medicine (Synjardy) and increased the dose of another (Ozempic). You have high blood pressure, diabetes, and some other medical conditions.  Tests in the Emergency Room showed that your body was very acidic (DKA), likely due to the changes in your diabetes medications, even though your blood sugar wasn't extremely high.  You were also dehydrated. You were given fluids and medication to help correct this.  You were admitted to the intensive care unit (MICU) and given insulin and fluids. A diabetes specialist (endocrinologist) recommended stopping Synjardy and Ozempic.  You will be going home on insulin injections and will need a blood test (c-peptide) done as outpatient  You should follow up with your primary care doctor, endocrinologist, podiatrist, and ophthalmologist very soon.  If you experience nausea, vomiting, difficulty breathing, excessive thirst, or any other concerning symptoms, please return to the hospital immediately. It’s very important to take your medications exactly as prescribed and keep all scheduled appointments.   You should check glucose finger sticks premeals and bedtime. Patient should call their doctor when FSBG <70 or above >400 and or consistently above 200s as changes in the regimen will have to be made.   Please take medications as instructed below:  - STOP Synjardy   - STOP Ozempic  - START Metformin 1000mg 2 x day  - START 10u Lantus - move it 2hrs each day until at bed time (once daily)  - Follow up with Dr. Cardoza (scheduled for 6/19/25)  - Routine follow-up with Ophthalmology and Podiatry         SECONDARY DISCHARGE DIAGNOSES  Diagnosis: Gastritis  Assessment and Plan of Treatment: take your pantoprazole daily; make sure to have food with any ibuprofen/motrin/aleve as this can flare up your gastritis    Diagnosis: Hypophosphatemia  Assessment and Plan of Treatment: Your phophorus was low, likely as part of your DKA with eletrolyte shifts.  This has resolved    Diagnosis: Hypertension  Assessment and Plan of Treatment: Your blood pressure medications were held in the hospital because your blood pressure was a little lower than usual. Your diltiazem was resumed but your HCTZ and losartan have not been resumed.  Pleaes follow up with your primary care doctor for when these meds should be restarted    Diagnosis: Hyperlipidemia  Assessment and Plan of Treatment: COntinue your atorvastatin    Diagnosis: Pancreatic cyst  Assessment and Plan of Treatment: You were incidentally found to have a small cyst on your pancreas.  Please follow up with your primary care doctor to further evaulation     PRINCIPAL DISCHARGE DIAGNOSIS  Diagnosis: DKA (diabetic ketoacidosis)  Assessment and Plan of Treatment: You came to the hospital because you were vomiting and having trouble breathing. You recently started a new diabetes medicine (Synjardy) and increased the dose of another (Ozempic). You have high blood pressure, diabetes, and some other medical conditions.  Tests in the Emergency Room showed that your body was very acidic (DKA), likely due to the changes in your diabetes medications, even though your blood sugar wasn't extremely high.  You were also dehydrated. You were given fluids and medication to help correct this.  You were admitted to the intensive care unit (MICU) and given insulin and fluids. A diabetes specialist (endocrinologist) recommended stopping Synjardy and Ozempic.  You will be going home on insulin injections and will need a blood test (c-peptide) done as outpatient  You should follow up with your primary care doctor, endocrinologist, podiatrist, and ophthalmologist very soon.  If you experience nausea, vomiting, difficulty breathing, excessive thirst, or any other concerning symptoms, please return to the hospital immediately. It’s very important to take your medications exactly as prescribed and keep all scheduled appointments.   You should check glucose finger sticks premeals and bedtime. Patient should call their doctor when FSBG <70 or above >400 and or consistently above 200s as changes in the regimen will have to be made.   Please take medications as instructed below:  - STOP Synjardy   - STOP Ozempic  - START Metformin 1000mg 2 x day  - START 10u Lantus - move it 2hrs each day until at bed time (once daily)  - Follow up with Dr. Cardoza (scheduled for 6/19/25)  - Routine follow-up with Ophthalmology and Podiatry         SECONDARY DISCHARGE DIAGNOSES  Diagnosis: Gastritis  Assessment and Plan of Treatment: take your pantoprazole daily; make sure to have food with any ibuprofen/motrin/aleve as this can flare up your gastritis    Diagnosis: Hypophosphatemia  Assessment and Plan of Treatment: Your phophorus was low, likely as part of your DKA with eletrolyte shifts.  This has resolved    Diagnosis: Hypertension  Assessment and Plan of Treatment: Your blood pressure medications were held in the hospital because your blood pressure was a little lower than usual. Your diltiazem was resumed but your HCTZ and losartan have not been resumed****.    Pleaes follow up with your primary care doctor for when these meds should be restarted    Diagnosis: Hyperlipidemia  Assessment and Plan of Treatment: COntinue your atorvastatin, low fat diet    Diagnosis: Pancreatic cyst  Assessment and Plan of Treatment: You were incidentally found to have a small cyst on your pancreas.  Please follow up with your primary care doctor to further evaulation

## 2025-05-07 NOTE — PROGRESS NOTE ADULT - SUBJECTIVE AND OBJECTIVE BOX
Chief Complaint: DKA, now resolved, type 2 DM    History: saw pt at bedside, family at bedside. Tolerating PO diet, fair/good appetite. FS at goal last night and this morning, above goal this afternoon.     MEDICATIONS  (STANDING):  atorvastatin 20 milliGRAM(s) Oral at bedtime  chlorhexidine 2% Cloths 1 Application(s) Topical <User Schedule>  clopidogrel Tablet 75 milliGRAM(s) Oral every 24 hours  dextrose 5%. 1000 milliLiter(s) (50 mL/Hr) IV Continuous <Continuous>  dextrose 5%. 1000 milliLiter(s) (100 mL/Hr) IV Continuous <Continuous>  dextrose 50% Injectable 50 milliLiter(s) IV Push every 15 minutes  enoxaparin Injectable 40 milliGRAM(s) SubCutaneous every 24 hours  glucagon  Injectable 1 milliGRAM(s) IntraMuscular once  insulin glargine Injectable (LANTUS) 10 Unit(s) SubCutaneous <User Schedule>  insulin lispro (ADMELOG) corrective regimen sliding scale   SubCutaneous at bedtime  insulin lispro (ADMELOG) corrective regimen sliding scale   SubCutaneous three times a day before meals  insulin lispro Injectable (ADMELOG) 3 Unit(s) SubCutaneous three times a day before meals  multivitamin 1 Tablet(s) Oral daily  mupirocin 2% Nasal 1 Application(s) Both Nostrils two times a day    MEDICATIONS  (PRN):  albuterol    90 MICROgram(s) HFA Inhaler 2 Puff(s) Inhalation every 6 hours PRN for shortness of breath and/or wheezing  benzocaine/menthol Lozenge 1 Lozenge Oral every 2 hours PRN Sore Throat  dextrose Oral Gel 15 Gram(s) Oral once PRN Blood Glucose LESS THAN 70 milliGRAM(s)/deciliter      Allergies    Tylenol (Hives)    Intolerances      Review of Systems:  Cardiovascular: No chest pain, palpitations  Respiratory: No SOB, no cough  GI: No nausea, vomiting, abdominal pain  : No dysuria  Endocrine: no polyuria, polydipsia      PHYSICAL EXAM:  VITALS: T(C): 37.1 (05-07-25 @ 04:00)  T(F): 98.8 (05-07-25 @ 04:00), Max: 98.8 (05-06-25 @ 20:00)  HR: 105 (05-07-25 @ 08:00) (82 - 105)  BP: 139/110 (05-07-25 @ 08:00) (101/77 - 139/110)  RR:  (15 - 21)  SpO2:  (98% - 100%)  Wt(kg): --  GENERAL: NAD, well-groomed, well-developed  EYES: No proptosis  HEENT:  Atraumatic, Normocephalic  RESPIRATORY: non labored breathing   CARDIOVASCULAR: Regular rate and rhythm  GI: Soft, nontender, non distended  PSYCH: Alert and oriented x 3, normal affect, normal mood       CAPILLARY BLOOD GLUCOSE      POCT Blood Glucose.: 253 mg/dL (07 May 2025 11:42)  POCT Blood Glucose.: 150 mg/dL (07 May 2025 07:55)  POCT Blood Glucose.: 189 mg/dL (07 May 2025 01:56)  POCT Blood Glucose.: 158 mg/dL (06 May 2025 21:57)  POCT Blood Glucose.: 154 mg/dL (06 May 2025 18:22)  POCT Blood Glucose.: 162 mg/dL (06 May 2025 16:37)  POCT Blood Glucose.: 152 mg/dL (06 May 2025 15:15)  POCT Blood Glucose.: 149 mg/dL (06 May 2025 14:05)  POCT Blood Glucose.: 164 mg/dL (06 May 2025 13:16)      05-07    139  |  107  |  <2[L]  ----------------------------<  194[H]  4.1   |  22  |  0.45[L]    eGFR: 103    Ca    7.5[L]      05-07  Mg     2.00     05-07  Phos  2.1     05-07    TPro  x   /  Alb  x   /  TBili  0.5  /  DBili  x   /  AST  x   /  ALT  x   /  AlkPhos  x   05-06          Thyroid Function Tests:        A1C with Estimated Average Glucose Result: 8.6 % (05-04-25 @ 15:14)          Diet, Consistent Carbohydrate/No Snacks (05-06-25 @ 17:18)

## 2025-05-08 LAB
ALBUMIN SERPL ELPH-MCNC: 3.1 G/DL — LOW (ref 3.3–5)
ALP SERPL-CCNC: 49 U/L — SIGNIFICANT CHANGE UP (ref 40–120)
ALT FLD-CCNC: 27 U/L — SIGNIFICANT CHANGE UP (ref 4–33)
ANION GAP SERPL CALC-SCNC: 11 MMOL/L — SIGNIFICANT CHANGE UP (ref 7–14)
AST SERPL-CCNC: 28 U/L — SIGNIFICANT CHANGE UP (ref 4–32)
BILIRUB SERPL-MCNC: 0.6 MG/DL — SIGNIFICANT CHANGE UP (ref 0.2–1.2)
BUN SERPL-MCNC: 2 MG/DL — LOW (ref 7–23)
CALCIUM SERPL-MCNC: 8.8 MG/DL — SIGNIFICANT CHANGE UP (ref 8.4–10.5)
CHLORIDE SERPL-SCNC: 105 MMOL/L — SIGNIFICANT CHANGE UP (ref 98–107)
CO2 SERPL-SCNC: 25 MMOL/L — SIGNIFICANT CHANGE UP (ref 22–31)
CREAT SERPL-MCNC: 0.52 MG/DL — SIGNIFICANT CHANGE UP (ref 0.5–1.3)
EGFR: 99 ML/MIN/1.73M2 — SIGNIFICANT CHANGE UP
EGFR: 99 ML/MIN/1.73M2 — SIGNIFICANT CHANGE UP
GAS PNL BLDV: SIGNIFICANT CHANGE UP
GLUCOSE BLDC GLUCOMTR-MCNC: 138 MG/DL — HIGH (ref 70–99)
GLUCOSE BLDC GLUCOMTR-MCNC: 166 MG/DL — HIGH (ref 70–99)
GLUCOSE BLDC GLUCOMTR-MCNC: 179 MG/DL — HIGH (ref 70–99)
GLUCOSE BLDC GLUCOMTR-MCNC: 181 MG/DL — HIGH (ref 70–99)
GLUCOSE BLDC GLUCOMTR-MCNC: 238 MG/DL — HIGH (ref 70–99)
GLUCOSE SERPL-MCNC: 181 MG/DL — HIGH (ref 70–99)
HCT VFR BLD CALC: 37.5 % — SIGNIFICANT CHANGE UP (ref 34.5–45)
HGB BLD-MCNC: 12.6 G/DL — SIGNIFICANT CHANGE UP (ref 11.5–15.5)
MAGNESIUM SERPL-MCNC: 1.8 MG/DL — SIGNIFICANT CHANGE UP (ref 1.6–2.6)
MCHC RBC-ENTMCNC: 28.8 PG — SIGNIFICANT CHANGE UP (ref 27–34)
MCHC RBC-ENTMCNC: 33.6 G/DL — SIGNIFICANT CHANGE UP (ref 32–36)
MCV RBC AUTO: 85.8 FL — SIGNIFICANT CHANGE UP (ref 80–100)
NRBC # BLD AUTO: 0 K/UL — SIGNIFICANT CHANGE UP (ref 0–0)
NRBC # FLD: 0 K/UL — SIGNIFICANT CHANGE UP (ref 0–0)
NRBC BLD AUTO-RTO: 0 /100 WBCS — SIGNIFICANT CHANGE UP (ref 0–0)
PHOSPHATE SERPL-MCNC: 2.4 MG/DL — LOW (ref 2.5–4.5)
PLATELET # BLD AUTO: 216 K/UL — SIGNIFICANT CHANGE UP (ref 150–400)
POTASSIUM SERPL-MCNC: 3.9 MMOL/L — SIGNIFICANT CHANGE UP (ref 3.5–5.3)
POTASSIUM SERPL-SCNC: 3.9 MMOL/L — SIGNIFICANT CHANGE UP (ref 3.5–5.3)
PROT SERPL-MCNC: 5.4 G/DL — LOW (ref 6–8.3)
RBC # BLD: 4.37 M/UL — SIGNIFICANT CHANGE UP (ref 3.8–5.2)
RBC # FLD: 14.6 % — HIGH (ref 10.3–14.5)
SODIUM SERPL-SCNC: 141 MMOL/L — SIGNIFICANT CHANGE UP (ref 135–145)
WBC # BLD: 4.27 K/UL — SIGNIFICANT CHANGE UP (ref 3.8–10.5)
WBC # FLD AUTO: 4.27 K/UL — SIGNIFICANT CHANGE UP (ref 3.8–10.5)

## 2025-05-08 PROCEDURE — 99233 SBSQ HOSP IP/OBS HIGH 50: CPT

## 2025-05-08 PROCEDURE — 93010 ELECTROCARDIOGRAM REPORT: CPT

## 2025-05-08 RX ORDER — MUPIROCIN CALCIUM 20 MG/G
1 CREAM TOPICAL
Refills: 0 | Status: DISCONTINUED | OUTPATIENT
Start: 2025-05-08 | End: 2025-05-09

## 2025-05-08 RX ORDER — MAGNESIUM, ALUMINUM HYDROXIDE 200-200 MG
30 TABLET,CHEWABLE ORAL EVERY 6 HOURS
Refills: 0 | Status: DISCONTINUED | OUTPATIENT
Start: 2025-05-08 | End: 2025-05-09

## 2025-05-08 RX ADMIN — Medication 30 MILLILITER(S): at 14:06

## 2025-05-08 RX ADMIN — ENOXAPARIN SODIUM 40 MILLIGRAM(S): 100 INJECTION SUBCUTANEOUS at 05:11

## 2025-05-08 RX ADMIN — INSULIN GLARGINE-YFGN 10 UNIT(S): 100 INJECTION, SOLUTION SUBCUTANEOUS at 14:06

## 2025-05-08 RX ADMIN — MUPIROCIN CALCIUM 1 APPLICATION(S): 20 CREAM TOPICAL at 16:58

## 2025-05-08 RX ADMIN — Medication 1 APPLICATION(S): at 05:10

## 2025-05-08 RX ADMIN — INSULIN LISPRO 1: 100 INJECTION, SOLUTION INTRAVENOUS; SUBCUTANEOUS at 08:23

## 2025-05-08 RX ADMIN — INSULIN LISPRO 2: 100 INJECTION, SOLUTION INTRAVENOUS; SUBCUTANEOUS at 12:33

## 2025-05-08 RX ADMIN — INSULIN LISPRO 3 UNIT(S): 100 INJECTION, SOLUTION INTRAVENOUS; SUBCUTANEOUS at 17:51

## 2025-05-08 RX ADMIN — ATORVASTATIN CALCIUM 20 MILLIGRAM(S): 80 TABLET, FILM COATED ORAL at 21:38

## 2025-05-08 RX ADMIN — MUPIROCIN CALCIUM 1 APPLICATION(S): 20 CREAM TOPICAL at 05:11

## 2025-05-08 RX ADMIN — INSULIN LISPRO 3 UNIT(S): 100 INJECTION, SOLUTION INTRAVENOUS; SUBCUTANEOUS at 12:33

## 2025-05-08 RX ADMIN — Medication 1 TABLET(S): at 12:35

## 2025-05-08 RX ADMIN — CLOPIDOGREL BISULFATE 75 MILLIGRAM(S): 75 TABLET, FILM COATED ORAL at 05:10

## 2025-05-08 RX ADMIN — INSULIN LISPRO 3 UNIT(S): 100 INJECTION, SOLUTION INTRAVENOUS; SUBCUTANEOUS at 08:23

## 2025-05-08 NOTE — PROGRESS NOTE ADULT - ASSESSMENT
70yoF PMH HTN, CVA no deficits on plavix, DM recently placed on SYnjardy (metformin/empagliflozin) 3 weeks ago, & increased on her ozempic a few days, ago, p/w multiple episodes of nbnb emesis, Kussmaul breathing, c/w diabetic ketoacidosis.     Neurology   A&Ox3  #Hx of CVA  -Resume plavix     #C/f metabolic encephalopathy given DKA  -q4 neuro checks     Cardiology   #Tachycardia (Likely iso dehydration given unimpressive U/A w/ trace bacteria); sinus on telemetry; RRR No murmurs, rubs, gallops on PE  -12 Lead EKG to evaluate for arrhythmia    -Continuous telemetry    Respiratory  #Kussmaul respirations iso DKA  -Continuous pulse oximetry and q4 VBGs to evaluate acidemia and monitor improvement     #Hx of multiple pulmonary nodules (finding on CT March 2024), bronchiectasis  -Follow up outpatient pulm in 6 months and outpatient CT for disease surveillance and to monitor progression =    Endocrine   #Diabetic ketoacidosis (Euglycemic initially)  #T2DM c/b mild diabetic retinopathy, non-insulin dependent  -Etiology: Most likely drug induced from SGLT-2 and/or change in ozempic,  ddx: UTI though U/A not too impressive  Labs: Glucose >1000 in urine, ketonuria, mild protein proteinuria, pyuria, trace bacteria  -Insulin gtt  @ rate of 5.4U/hr (0.1 ml/kg); adjust based on BG levels   -D5LR @ rate of 150 ml/hr  -Glucose fingersticks q1   -Trend BHB, BMP, and VBGs q4  -Maintain NPO until gap closes and patient transitioned  -Replete K+ w/ 20 mEq intermittent infusions  -F/u urine cx    Renal   High Anion Gap Metabolic Acidosis 2/2 DKA  -Labs: pH venous 6.97, BHB >9.0, bicarb 7, AG >38 in ED. S/p HCO3- infusion for acidemia <7.10 and 4L volume repletion w/ NS  -Trend BMP, BHB, VBGs q4 initially to monitor acidemia   -Monitor urine output   -Rest of plan as documented in endocrine section     Gastroenterology  #Hx of gastritis   -Patient has a self-reported Tylenol allergy; takes ibuprofen for pain; found to have gastritis on EGD   -Resume pantoprazole once dose verified   -Limit NSAID use     #Hx of Pancreatic Cyst   -F/u as an outpatient     Infectious Diseases   #Leukocytosis   -possibly iso dehydration d/t DKA vs stress vs possible infection (unlikely)  Labs: WBC 21.19, U/A w/ trace bacteria and small LE   -F/u results of urine cx    Hematology/Oncology   #Leukocytosis, Thrombocytosis, Erythrocytosis suspect iso dehydration  -CTM     DVT prophylaxis  Lovenox 40 mg qd
 70yoF PMH HTN, CVA no deficits on plavix, DM recently placed on SYnjardy (metformin/empagliflozin) 3 weeks ago, & increased on her ozempic a few days, ago, p/w multiple episodes of nbnb emesis, Kussmaul breathing, c/w diabetic ketoacidosis.     Neurology   A&Ox3  #Hx of CVA  -Resume plavix     #C/f metabolic encephalopathy given DKA  -q4 neuro checks     Cardiology   #Tachycardia (Likely iso dehydration given unimpressive U/A w/ trace bacteria); sinus on telemetry; RRR No murmurs, rubs, gallops on PE  -12 Lead EKG to evaluate for arrhythmia    -Continuous telemetry    Respiratory  -Continuous pulse oximetry  - q4 VBGs to evaluate acidemia and monitor improvement     #Hx of multiple pulmonary nodules (finding on CT March 2024), bronchiectasis  -Follow up outpatient pulm in 6 months and outpatient CT for disease surveillance and to monitor progression     Endocrine   #Diabetic ketoacidosis (Euglycemic initially)  #T2DM c/b mild diabetic retinopathy, non-insulin dependent  -Etiology: Most likely drug induced from SGLT-2 and/or change in ozempic,  ddx: UTI though U/A not too impressive  Labs: Glucose >1000 in urine, ketonuria, mild protein proteinuria, pyuria, trace bacteria  -Insulin gtt  per DKA protocol  -D5LR w/  20 mEq K  -Glucose fingersticks q1   -Trend BHB, BMP, and VBGs q4  -Maintain NPO until gap closes and patient transitioned  -F/u urine cx    Renal   High Anion Gap Metabolic Acidosis 2/2 DKA  -Labs: pH venous 6.97, BHB >9.0, bicarb 7, AG >38 - improving  - S/p HCO3- infusion for acidemia <7.10 and 4L volume repletion w/ NS  -Rest of plan as documented in endocrine section     Gastroenterology  #Hx of gastritis   -Patient has a self-reported Tylenol allergy; takes ibuprofen for pain; found to have gastritis on EGD   -c/w home pantoprazole  -Limit NSAID use     #Hx of Pancreatic Cyst   -F/u as an outpatient     Infectious Diseases   #Leukocytosis   -possibly iso dehydration d/t DKA vs stress vs possible infection (unlikely)  Labs: WBC 21.19, U/A w/ trace bacteria and small LE   -F/u results of urine cx    Hematology/Oncology   #Leukocytosis, Thrombocytosis, Erythrocytosis suspect iso dehydration  -CTM     DVT prophylaxis  Lovenox 40 mg qd
 70yoF PMH HTN, CVA no deficits on plavix, DM recently placed on SYnjardy (metformin/empagliflozin) 3 weeks ago, & increased on her ozempic a few days, ago, p/w multiple episodes of nbnb emesis, Kussmaul breathing, c/w diabetic ketoacidosis.     Neurology   A&Ox3  #Hx of CVA  -Resume plavix     #C/f metabolic encephalopathy given DKA  -q4 neuro checks     Cardiology   #Tachycardia (Likely iso dehydration given unimpressive U/A w/ trace bacteria); sinus on telemetry; RRR No murmurs, rubs, gallops on PE  - resolved  -Continuous telemetry    Respiratory  Tachypnea  - i/s/o DKA, now resolving  - on room air     #Hx of multiple pulmonary nodules (finding on CT March 2024), bronchiectasis  -Follow up outpatient pulm in 6 months and outpatient CT for disease surveillance and to monitor progression     Endocrine   #Diabetic ketoacidosis (Euglycemic initially)  Labs: Glucose >1000 in urine, ketonuria, mild protein proteinuria, pyuria, trace bacteria  - s/p Insulin gtt  per DKA protocol  - s/p D5LR w/  20 mEq K while NPO  - Gap closed. Acidemia resolved     Plan  - Endocrine following, appreciated recs  - Transitioned to insulin injection: 8u Lantus, 2 ADMELOG tid     #T2DM c/b mild diabetic retinopathy, non-insulin dependent  - hold home meds: Synjardy 12.5-1000, Ozempic 1mg (recently increased dose)  - Regimen: 8u Lantus, 2 ADMELOG tid, LDISS    Renal   High Anion Gap Metabolic Acidosis 2/2 DKA  -Labs: pH venous 6.97, BHB >9.0, bicarb 7, AG >38 - improving  - S/p HCO3- infusion for acidemia <7.10 and 4L volume repletion w/ NS  - resolved    Gastroenterology  #Hx of gastritis   -Patient has a self-reported Tylenol allergy; takes ibuprofen for pain; found to have gastritis on EGD   -c/w home pantoprazole  -Limit NSAID use     #Hx of Pancreatic Cyst   -F/u as an outpatient     Infectious Diseases   #Leukocytosis   -possibly iso dehydration d/t DKA vs stress vs possible infection (unlikely)  Labs: WBC 21.19, U/A w/ trace bacteria and small LE   - resolved    Hematology/Oncology   #Leukocytosis, Thrombocytosis, Erythrocytosis suspect iso dehydration  -CTM     DVT prophylaxis  Lovenox 40 mg qd
 70yoF PMH HTN, CVA no deficits on plavix, DM recently placed on SYnjardy (metformin/empagliflozin) 3 weeks ago, & increased on her ozempic a few days, ago, p/w multiple episodes of nbnb emesis, Kussmaul breathing, c/w diabetic ketoacidosis.     Neurology   A&Ox3  #Hx of CVA  -Resume plavix     #C/f metabolic encephalopathy given DKA  resolved    Cardiology   #Tachycardia (Likely iso dehydration given unimpressive U/A w/ trace bacteria); sinus on telemetry; RRR No murmurs, rubs, gallops on PE  -Continuous telemetry    Respiratory  Tachypnea, resolved    #Hx of multiple pulmonary nodules (finding on CT March 2024), bronchiectasis  -Follow up outpatient pulm in 6 months and outpatient CT for disease surveillance and to monitor progression     Endocrine   #Diabetic ketoacidosis (Euglycemic initially)  Labs: Glucose >1000 in urine, ketonuria, mild protein proteinuria, pyuria, trace bacteria  - s/p Insulin gtt  per DKA protocol  - s/p D5LR w/  20 mEq K while NPO  - Gap closed. Acidemia resolved     Plan  - Endocrine following, appreciated recs  - Transitioned to insulin injection: 8u Lantus, 2 ADMELOG tid     #T2DM c/b mild diabetic retinopathy, non-insulin dependent  - hold home meds: Synjardy 12.5-1000, Ozempic 1mg (recently increased dose)  - Regimen: 8u Lantus, 2 ADMELOG tid, LDISS    Renal   High Anion Gap Metabolic Acidosis 2/2 DKA  -Labs: pH venous 6.97, BHB >9.0, bicarb 7, AG >38 -resolved  - S/p HCO3- infusion for acidemia <7.10 and 4L volume repletion w/ NS  - resolved    Gastroenterology  #Hx of gastritis   -Patient has a self-reported Tylenol allergy; takes ibuprofen for pain; found to have gastritis on EGD   -c/w home pantoprazole  -Limit NSAID use     #Hx of Pancreatic Cyst   -F/u as an outpatient     Infectious Diseases   #Leukocytosis   -possibly iso dehydration d/t DKA vs stress vs possible infection (unlikely)  Labs: WBC 21.19, U/A w/ trace bacteria and small LE   - resolved    Hematology/Oncology   #Leukocytosis, Thrombocytosis, Erythrocytosis suspect iso dehydration  resolved    DVT prophylaxis  Lovenox 40 mg qd    dc planning once FS with adequate control and optimized by endo
70F PMH HTN, CVA (on plavix monotherapy), osteoporosis, T2DM c/b mild proliferative diabetic retinopathy (non-insulin dependent) recently placed on Synjardy (metformin/empagliflozin) 3 weeks ago, & increased on her ozempic a few days ago, gastritis, pancreatic cyst, and multiple pulmonary nodules p/w DKA (pH 6.97, BHB >9, bicarb 8, BS 300s). s/p insulin gtt in MICU, transitioned to basal insulin 5/6 however still with poor appetite, resumed NPO status with q4 low dose  Admelog scale. Endocrine consulted for further management.     #DKA  #T2DM with hyperglycemia  - A1c 8.6%  - Home meds: Synjardy 12.5-1000 mg, Ozempic 1 mg  - Follows with Dr. Arben Cardoza  - Started on insulin gtt --> transitioned to Lantus 8 units at 13h46 on 5/6, however still with poor PO and rising BHB, made NPO and put on low dose scale q4 Admelog scale    PLAN  While inpatient:  - FS goal 140-180  -FS at goal last night and this morning, above goal this afternoon.   - Increase lantus to 10 units starting today- ordered STAT for 1230pm  - Increase admelog to 3 units premeal TID. hold if not eating or NPO.   - Monitor FS with meals and at bedtime  - Consistent carb diet   - Hypoglycemia protocol  - Appreciate Nutrition recs  - Please obtain lipid panel in AM    labs to check   cpeptide with glucose  anti ISLET  anti ALISSA   zn transporter 8   IA -2 antibody       Discharge Recommendations:  - Discontinue Synjardy (because need to stop Jardiance)  - would hold ozempic given n/v at home and pt appetite is fair, also pt already lost 40lbs on it and now is not looking to lose anymore weight.   - Start basal insulin 10 units daily- received at 1230pm- can move 2 hours each day to get to bedtime dosing.   - Start metformin 1000 mg bid   - Please send script for glucometer, test strips, lancets, alcohol swabs and BD chacha needles.   - Patient should check FSBG premeals and bedtime. Patient should call their doctor when FSBG <70 or above >400 and or consistently above 200s as changes in the regimen will have to be made.   - Follow up with Dr. Cardoza (scheduled for 6/19/25)  - Routine follow-up with Ophthalmology and Podiatry   **Please teach pt and family house to use insulin pen and do injection and how to use glucometer.    #HTN  - Goal BP <130/80  - Management per primary team  - UACR as outpatient     #Osteoporosis  -pt on alendronate outpt - fu with outpt endocrine   - OP DEXA     #HLD  - Goal LDL <70  - Obtain lipid panel in AM  - c/w atorvstatin 20 mg    D/w primary team Dr. Young.     Reinaldo Garcia, [facility]-BC  Nurse Practitioner  Division of Endocrinology & Diabetes  Available on Microsoft Teams     For follow up questions, discharge recommendations, or new consults, please email [email or web address] ([email or web address]) or call answering service at [phone] (weekdays); [phone] (nights/weekends).  For emergencies please page fellow on call.     
none

## 2025-05-08 NOTE — PHYSICAL THERAPY INITIAL EVALUATION ADULT - ADDITIONAL COMMENTS
Pt. was left in bed post PT Evaluation, no apparent distress, all lines intact, call bell within reach, +bed alarm. RN made aware of pt. status and participation in PT.

## 2025-05-08 NOTE — PHYSICAL THERAPY INITIAL EVALUATION ADULT - GAIT DISTANCE, PT EVAL
-- [id number] [address] [facility] [address]    Limited secondary to fatigue, noted  bpm. Pt. reported feeling tired and improved with rest, HR 85 bpm./25 feet

## 2025-05-08 NOTE — PHYSICAL THERAPY INITIAL EVALUATION ADULT - PERTINENT HX OF CURRENT PROBLEM, REHAB EVAL
Per documentation, pt. presented with non-bilious, non-bloody emesis, Kussmaul respirations, and lethargy, consistent with diabetic ketoacidosis. Treated in MICU with insulin drip per DKA protocol.

## 2025-05-08 NOTE — PHYSICAL THERAPY INITIAL EVALUATION ADULT - GENERAL OBSERVATIONS, REHAB EVAL
Consult received, chart reviewed. Patient received in bed, no apparent distress, +tele, +pulse ox. Pt. agreeable to participate in PT.

## 2025-05-08 NOTE — PHYSICAL THERAPY INITIAL EVALUATION ADULT - RANGE OF MOTION EXAMINATION, REHAB EVAL
Statement Selected
bilateral upper extremity ROM was WFL (within functional limits)/bilateral lower extremity ROM was WFL (within functional limits)

## 2025-05-08 NOTE — PROGRESS NOTE ADULT - SUBJECTIVE AND OBJECTIVE BOX
[facility] Division of Hospital Medicine  Marleny Estrada MD  Pager [phone]    Patient is a 70y old  Female who presents with a chief complaint of Dizziness/ NV       SUBJECTIVE / OVERNIGHT EVENTS: pt seen this AM, family at bedside; pt appears mildly anxious; daughter states pt got a little anxious about talking about dc      MEDICATIONS  (STANDING):  atorvastatin 20 milliGRAM(s) Oral at bedtime  chlorhexidine 2% Cloths 1 Application(s) Topical <User Schedule>  clopidogrel Tablet 75 milliGRAM(s) Oral every 24 hours  dextrose 5%. 1000 milliLiter(s) (50 mL/Hr) IV Continuous <Continuous>  dextrose 5%. 1000 milliLiter(s) (100 mL/Hr) IV Continuous <Continuous>  dextrose 50% Injectable 50 milliLiter(s) IV Push every 15 minutes  enoxaparin Injectable 40 milliGRAM(s) SubCutaneous every 24 hours  glucagon  Injectable 1 milliGRAM(s) IntraMuscular once  insulin glargine Injectable (LANTUS) 10 Unit(s) SubCutaneous <User Schedule>  insulin lispro (ADMELOG) corrective regimen sliding scale   SubCutaneous at bedtime  insulin lispro (ADMELOG) corrective regimen sliding scale   SubCutaneous three times a day before meals  insulin lispro Injectable (ADMELOG) 3 Unit(s) SubCutaneous three times a day before meals  multivitamin 1 Tablet(s) Oral daily  mupirocin 2% Ointment 1 Application(s) Topical two times a day    MEDICATIONS  (PRN):  albuterol    90 MICROgram(s) HFA Inhaler 2 Puff(s) Inhalation every 6 hours PRN for shortness of breath and/or wheezing  aluminum hydroxide/magnesium hydroxide/simethicone Suspension 30 milliLiter(s) Oral every 6 hours PRN Dyspepsia  benzocaine/menthol Lozenge 1 Lozenge Oral every 2 hours PRN Sore Throat  dextrose Oral Gel 15 Gram(s) Oral once PRN Blood Glucose LESS THAN 70 milliGRAM(s)/deciliter      CAPILLARY BLOOD GLUCOSE  POCT Blood Glucose.: 238 mg/dL (08 May 2025 12:17)  POCT Blood Glucose.: 179 mg/dL (08 May 2025 08:15)  POCT Blood Glucose.: 166 mg/dL (08 May 2025 00:31)  POCT Blood Glucose.: 162 mg/dL (07 May 2025 21:34)  POCT Blood Glucose.: 150 mg/dL (07 May 2025 16:27)        PHYSICAL EXAM:  Vital Signs Last 24 Hrs  T(F): 97.8 (08 May 2025 09:08), Max: 98.8 (07 May 2025 20:00)  HR: 113 (08 May 2025 09:08) (66 - 113)  BP: 139/70 (08 May 2025 09:08) (122/54 - 142/68)  RR: 18 (08 May 2025 09:08) (11 - 22)  SpO2: 100% (08 May 2025 09:08) (99% - 100%)    Parameters below as of 08 May 2025 09:08  Patient On (Oxygen Delivery Method): room air        CONSTITUTIONAL: NAD, appears comfortable  EYES: PERRLA; conjunctiva and sclera clear  ENMT: Moist oral mucosa; normal dentition  RESPIRATORY: Normal respiratory effort; grossly b/l AE  CARDIOVASCULAR: Regular rate and rhythm; No lower extremity edema  ABDOMEN: Nontender to palpation, normoactive bowel sounds  MUSCULOSKELETAL:  no clubbing or cyanosis of digits; no joint swelling or tenderness to palpation  PSYCH: A+O to person, place, and time; affect appropriate  NEUROLOGY: CN 2-12 are intact and symmetric; no gross sensory deficits   SKIN: No rashes; no palpable lesions    LABS:                        12.6   4.27  )-----------( 216      ( 08 May 2025 05:49 )             37.5     05-08    141  |  105  |  2[L]  ----------------------------<  181[H]  3.9   |  25  |  0.52    Ca    8.8      08 May 2025 05:49  Phos  2.4     05-08  Mg     1.80     05-08    TPro  5.4[L]  /  Alb  3.1[L]  /  TBili  0.6  /  DBili  x   /  AST  28  /  ALT  27  /  AlkPhos  49  05-08

## 2025-05-08 NOTE — CHART NOTE - NSCHARTNOTEFT_GEN_A_CORE
Nutrition Follow-Up Chart Note         Source: Patient A&Ox4    Family [x ] Daughter  Chart [x ]     Pt is seen for nutrition consult as protocol.    __________________ Medical Course__________________     70yoF PMH HTN, CVA no deficits on plavix, DM recently placed on SYnjardy (metformin/empagliflozin) 3 weeks ago, & increased on her ozempic a few days, ago, p/w multiple episodes of nbnb emesis, Kussmaul breathing, c/w diabetic ketoacidosis.   Diet, Consistent Carbohydrate/No Snacks (05-06-25 @ 17:18)    Diet, Consistent Carbohydrate/No Snacks (05-06-25 @ 17:18) [Active]           __________________ Nutrition Course__________________   Met with patient and daughter. Pt with 100% breakfast completion at the time of visit. Pt follows Lactovegetarian as per daughter. Will recommend Lactovegetarian diet. Daughter voiced concerned regarding pt's diet, especially with her hyperglycemia on lactovegetarian diet and home cultural foods. Discussed plant based protein rich, fiber rich food choices in their culture. Discussed differences between complex and simply carbohydrate. RD to remain available for further nutritional interventions as indicated.     __________________ Pertinent Medications__________________   MEDICATIONS  (STANDING):  atorvastatin 20 milliGRAM(s) Oral at bedtime  chlorhexidine 2% Cloths 1 Application(s) Topical <User Schedule>  clopidogrel Tablet 75 milliGRAM(s) Oral every 24 hours  dextrose 5%. 1000 milliLiter(s) (50 mL/Hr) IV Continuous <Continuous>  dextrose 5%. 1000 milliLiter(s) (100 mL/Hr) IV Continuous <Continuous>  dextrose 50% Injectable 50 milliLiter(s) IV Push every 15 minutes  enoxaparin Injectable 40 milliGRAM(s) SubCutaneous every 24 hours  glucagon  Injectable 1 milliGRAM(s) IntraMuscular once  insulin glargine Injectable (LANTUS) 10 Unit(s) SubCutaneous <User Schedule>  insulin lispro (ADMELOG) corrective regimen sliding scale   SubCutaneous at bedtime  insulin lispro (ADMELOG) corrective regimen sliding scale   SubCutaneous three times a day before meals  insulin lispro Injectable (ADMELOG) 3 Unit(s) SubCutaneous three times a day before meals  multivitamin 1 Tablet(s) Oral daily  mupirocin 2% Ointment 1 Application(s) Topical two times a day    MEDICATIONS  (PRN):  albuterol    90 MICROgram(s) HFA Inhaler 2 Puff(s) Inhalation every 6 hours PRN for shortness of breath and/or wheezing  aluminum hydroxide/magnesium hydroxide/simethicone Suspension 30 milliLiter(s) Oral every 6 hours PRN Dyspepsia  benzocaine/menthol Lozenge 1 Lozenge Oral every 2 hours PRN Sore Throat  dextrose Oral Gel 15 Gram(s) Oral once PRN Blood Glucose LESS THAN 70 milliGRAM(s)/deciliter      __________________ Pertinent Labs__________________   05-08    141  |  105  |  2[L]  ----------------------------<  181[H]  3.9   |  25  |  0.52    Ca    8.8      08 May 2025 05:49  Phos  2.4     05-08  Mg     1.80     05-08    TPro  5.4[L]  /  Alb  3.1[L]  /  TBili  0.6  /  DBili  x   /  AST  28  /  ALT  27  /  AlkPhos  49  05-08                          12.6   4.27  )-----------( 216      ( 08 May 2025 05:49 )             37.5          POCT Blood Glucose.: 238 mg/dL (05-08-25 @ 12:17)  POCT Blood Glucose.: 179 mg/dL (05-08-25 @ 08:15)  POCT Blood Glucose.: 166 mg/dL (05-08-25 @ 00:31)  POCT Blood Glucose.: 162 mg/dL (05-07-25 @ 21:34)  POCT Blood Glucose.: 150 mg/dL (05-07-25 @ 16:27)  POCT Blood Glucose.: 253 mg/dL (05-07-25 @ 11:42)  POCT Blood Glucose.: 150 mg/dL (05-07-25 @ 07:55)  POCT Blood Glucose.: 189 mg/dL (05-07-25 @ 01:56)  POCT Blood Glucose.: 158 mg/dL (05-06-25 @ 21:57)  POCT Blood Glucose.: 154 mg/dL (05-06-25 @ 18:22)  POCT Blood Glucose.: 162 mg/dL (05-06-25 @ 16:37)  POCT Blood Glucose.: 152 mg/dL (05-06-25 @ 15:15)  POCT Blood Glucose.: 149 mg/dL (05-06-25 @ 14:05)  POCT Blood Glucose.: 164 mg/dL (05-06-25 @ 13:16)  POCT Blood Glucose.: 178 mg/dL (05-06-25 @ 12:09)  POCT Blood Glucose.: 161 mg/dL (05-06-25 @ 11:15)  POCT Blood Glucose.: 145 mg/dL (05-06-25 @ 10:10)  POCT Blood Glucose.: 140 mg/dL (05-06-25 @ 09:06)  POCT Blood Glucose.: 139 mg/dL (05-06-25 @ 08:01)  POCT Blood Glucose.: 153 mg/dL (05-06-25 @ 06:55)  POCT Blood Glucose.: 168 mg/dL (05-06-25 @ 05:55)  POCT Blood Glucose.: 163 mg/dL (05-06-25 @ 05:06)  POCT Blood Glucose.: 167 mg/dL (05-06-25 @ 04:04)  POCT Blood Glucose.: 139 mg/dL (05-06-25 @ 02:54)  POCT Blood Glucose.: 153 mg/dL (05-06-25 @ 01:57)  POCT Blood Glucose.: 150 mg/dL (05-06-25 @ 00:58)  POCT Blood Glucose.: 168 mg/dL (05-05-25 @ 23:58)  POCT Blood Glucose.: 184 mg/dL (05-05-25 @ 23:01)  POCT Blood Glucose.: 202 mg/dL (05-05-25 @ 21:57)  POCT Blood Glucose.: 181 mg/dL (05-05-25 @ 21:01)  POCT Blood Glucose.: 156 mg/dL (05-05-25 @ 19:59)  POCT Blood Glucose.: 147 mg/dL (05-05-25 @ 19:02)  POCT Blood Glucose.: 144 mg/dL (05-05-25 @ 18:06)  POCT Blood Glucose.: 158 mg/dL (05-05-25 @ 17:02)  POCT Blood Glucose.: 190 mg/dL (05-05-25 @ 15:55)  POCT Blood Glucose.: 157 mg/dL (05-05-25 @ 15:07)  POCT Blood Glucose.: 164 mg/dL (05-05-25 @ 14:05)        __________________ Anthropometrics__________________     Anthropometrics: Height (cm): 152.4 (05-08)  Weight (kg): 60.1 (05-08)  BMI (kg/m2): 25.9 (05-08)  IBW: 100  +/- 10%    Weight Assessment: as per RN flow sheet pt's recent weight 132.8 (5/8) weight trend reflects weight gain of 8lbs x 4 days ? recent weight accuracy likely due to fluid accumulation.     Edema: 1+ b/l arm as per RN flow sheet     Skin: None noted at present as per RN flow sheet.     Estimated Needs Assessment:   [x  ] recalculated, in consideration [facility]  Weight Used: dosing weight 124.1 (5/4)  Estimated Energy: 3798-6521  Kcal/kg/day (28-32  kcal/kg)  Estimated Protein:  56.4- 67.7gm/kg/day (1-1.2  gm/kg)  Estimated Fluid: per Medical discretion     Nutrition Focused Physical Exam:  [ x ] not applicable;        Previous Nutrition Diagnosis:   [ x ] Altered Nutrition Related Labs      Nutrition Diagnosis is : [ x] ongoing        Education:  [x  ] Given today        Type of education provided: Culturally appropriated DM diet education provided to both pt and her daughter, in addition provided low sodium diet as well with Heart Healthy Shopping Tips nutrition handout provided.          Recommendations:  [ x ] Recommend add Lactovegetarian diet.   [ x ] Honor food and fluid preferences as able.  [ x ] RD to remain available for further nutritional interventions as indicated.     Monitoring and Evaluation:   [ x ] Monitor PO intake, skin integrity, bowel regimen, and nutrition pertinent labs.  [ x ] Tolerance to diet prescription [ x ] weights [ x ] follow up per protocol

## 2025-05-09 ENCOUNTER — TRANSCRIPTION ENCOUNTER (OUTPATIENT)
Age: 70
End: 2025-05-09

## 2025-05-09 VITALS
RESPIRATION RATE: 18 BRPM | DIASTOLIC BLOOD PRESSURE: 74 MMHG | SYSTOLIC BLOOD PRESSURE: 139 MMHG | TEMPERATURE: 98 F | HEART RATE: 98 BPM | OXYGEN SATURATION: 96 %

## 2025-05-09 LAB
ALBUMIN SERPL ELPH-MCNC: 3.1 G/DL — LOW (ref 3.3–5)
ALP SERPL-CCNC: 51 U/L — SIGNIFICANT CHANGE UP (ref 40–120)
ALT FLD-CCNC: 33 U/L — SIGNIFICANT CHANGE UP (ref 4–33)
ANION GAP SERPL CALC-SCNC: 8 MMOL/L — SIGNIFICANT CHANGE UP (ref 7–14)
AST SERPL-CCNC: 45 U/L — HIGH (ref 4–32)
BILIRUB SERPL-MCNC: 0.4 MG/DL — SIGNIFICANT CHANGE UP (ref 0.2–1.2)
BUN SERPL-MCNC: 5 MG/DL — LOW (ref 7–23)
CALCIUM SERPL-MCNC: 9.4 MG/DL — SIGNIFICANT CHANGE UP (ref 8.4–10.5)
CHLORIDE SERPL-SCNC: 104 MMOL/L — SIGNIFICANT CHANGE UP (ref 98–107)
CO2 SERPL-SCNC: 27 MMOL/L — SIGNIFICANT CHANGE UP (ref 22–31)
CREAT SERPL-MCNC: 0.53 MG/DL — SIGNIFICANT CHANGE UP (ref 0.5–1.3)
CULTURE RESULTS: SIGNIFICANT CHANGE UP
CULTURE RESULTS: SIGNIFICANT CHANGE UP
EGFR: 99 ML/MIN/1.73M2 — SIGNIFICANT CHANGE UP
EGFR: 99 ML/MIN/1.73M2 — SIGNIFICANT CHANGE UP
GLUCOSE BLDC GLUCOMTR-MCNC: 197 MG/DL — HIGH (ref 70–99)
GLUCOSE BLDC GLUCOMTR-MCNC: 236 MG/DL — HIGH (ref 70–99)
GLUCOSE SERPL-MCNC: 181 MG/DL — HIGH (ref 70–99)
MAGNESIUM SERPL-MCNC: 1.8 MG/DL — SIGNIFICANT CHANGE UP (ref 1.6–2.6)
POTASSIUM SERPL-MCNC: 3.6 MMOL/L — SIGNIFICANT CHANGE UP (ref 3.5–5.3)
POTASSIUM SERPL-SCNC: 3.6 MMOL/L — SIGNIFICANT CHANGE UP (ref 3.5–5.3)
PROT SERPL-MCNC: 5.3 G/DL — LOW (ref 6–8.3)
SODIUM SERPL-SCNC: 139 MMOL/L — SIGNIFICANT CHANGE UP (ref 135–145)
SPECIMEN SOURCE: SIGNIFICANT CHANGE UP
SPECIMEN SOURCE: SIGNIFICANT CHANGE UP

## 2025-05-09 PROCEDURE — 99239 HOSP IP/OBS DSCHRG MGMT >30: CPT

## 2025-05-09 RX ORDER — DILTIAZEM HYDROCHLORIDE 120 MG/1
180 CAPSULE, EXTENDED RELEASE ORAL DAILY
Refills: 0 | Status: DISCONTINUED | OUTPATIENT
Start: 2025-05-09 | End: 2025-05-09

## 2025-05-09 RX ORDER — INSULIN GLARGINE-YFGN 100 [IU]/ML
10 INJECTION, SOLUTION SUBCUTANEOUS
Qty: 1 | Refills: 0
Start: 2025-05-09 | End: 2025-06-07

## 2025-05-09 RX ORDER — LOSARTAN POTASSIUM 100 MG/1
50 TABLET, FILM COATED ORAL DAILY
Refills: 0 | Status: DISCONTINUED | OUTPATIENT
Start: 2025-05-09 | End: 2025-05-09

## 2025-05-09 RX ADMIN — INSULIN LISPRO 3 UNIT(S): 100 INJECTION, SOLUTION INTRAVENOUS; SUBCUTANEOUS at 13:32

## 2025-05-09 RX ADMIN — Medication 1 APPLICATION(S): at 05:17

## 2025-05-09 RX ADMIN — INSULIN LISPRO 2: 100 INJECTION, SOLUTION INTRAVENOUS; SUBCUTANEOUS at 13:31

## 2025-05-09 RX ADMIN — Medication 1 TABLET(S): at 13:33

## 2025-05-09 RX ADMIN — ENOXAPARIN SODIUM 40 MILLIGRAM(S): 100 INJECTION SUBCUTANEOUS at 05:16

## 2025-05-09 RX ADMIN — INSULIN GLARGINE-YFGN 10 UNIT(S): 100 INJECTION, SOLUTION SUBCUTANEOUS at 13:38

## 2025-05-09 RX ADMIN — INSULIN LISPRO 1: 100 INJECTION, SOLUTION INTRAVENOUS; SUBCUTANEOUS at 08:49

## 2025-05-09 RX ADMIN — INSULIN LISPRO 3 UNIT(S): 100 INJECTION, SOLUTION INTRAVENOUS; SUBCUTANEOUS at 08:50

## 2025-05-09 RX ADMIN — MUPIROCIN CALCIUM 1 APPLICATION(S): 20 CREAM TOPICAL at 05:17

## 2025-05-09 RX ADMIN — CLOPIDOGREL BISULFATE 75 MILLIGRAM(S): 75 TABLET, FILM COATED ORAL at 05:17

## 2025-05-09 RX ADMIN — DILTIAZEM HYDROCHLORIDE 180 MILLIGRAM(S): 120 CAPSULE, EXTENDED RELEASE ORAL at 13:39

## 2025-05-09 NOTE — DISCHARGE NOTE NURSING/CASE MANAGEMENT/SOCIAL WORK - NSDCFUADDAPPT_GEN_ALL_CORE_FT
APPTS ARE READY TO BE MADE: [X] YES    Best Family or Patient Contact (if needed): Self, son    Additional Information about above appointments (if needed):    1: Endocrinology  2: PCP  3: Podiatry  4: Ophthalmology    Other comments or requests:

## 2025-05-09 NOTE — DISCHARGE NOTE NURSING/CASE MANAGEMENT/SOCIAL WORK - FINANCIAL ASSISTANCE
[facility] provides services at a reduced cost to those who are determined to be eligible through [facility]’s financial assistance program. Information regarding [facility]’s financial assistance program can be found by going to [email or web address] or by calling [phone].

## 2025-05-09 NOTE — CHART NOTE - NSCHARTNOTESELECT_GEN_ALL_CORE
MAR Accept/Event Note
Nutrition Services Assessment Education/Nutrition Services
Transfer Note-MICU downgrade
need for rolling walker/Event Note
Endocrine
POCUS

## 2025-05-09 NOTE — DISCHARGE NOTE NURSING/CASE MANAGEMENT/SOCIAL WORK - PATIENT PORTAL LINK FT
You can access the FollowMyHealth Patient Portal offered by [facility] by registering at the following website: [email or web address]. By joining [facility]’s FollowMyHealth portal, you will also be able to view your health information using other applications (apps) compatible with our system.

## 2025-05-09 NOTE — CHART NOTE - NSCHARTNOTEFT_GEN_A_CORE
70yoF PMH HTN, CVA (on plavix monotherapy), T2DM c/b mild proliferative diabetic retinopathy (non-insulin dependent) recently placed on SYnjardy (metformin/empagliflozin) 3 weeks ago, & increased on her ozempic a few days, ago, gastritis, pancreatic cyst, and multiple pulmonary nodules p/w multiple episodes of nbnb emesis that started 1 day ago. Pt's son states that he noticed she was beginning to have symptoms on Thursday. That same day, she experienced some nausea and vomited. The family contacted her PCP who notified her that some of these symptoms may be due to the dose increase of her Ozempic from 0.5-1. She has had fatigue and GI symptoms since starting Ozempic. On Friday, she felt better. Then on Saturday, she began to have recurrent bouts of emesis, nausea, and difficulties with breathing. Pt denies abd pain, fevers, SOB, diarrhea, constipation, sick contacts, chest pain. Review of systems + for polydypsia and back pain.  MICU consulted for evaluation of severe abdominal deficits and c/f  DKA/dehydration    pt w/ generalized weakness, evaluated by PT, will require a rolling walker to be able to perform their MRADLs within their home.  Home PT    Renay Dietrich [facility] pager [phone]

## 2025-05-09 NOTE — CHART NOTE - NSCHARTNOTEFT_GEN_A_CORE
Endocrine     Called for discharge recommendations - see last progress note for complete plan of care including outlined DC plan   Please follow plan of care outlined in endocrine note on 5/7/25  No c-peptide ordered. Would check c-peptide if able prior to DC. However, patient will be DC on basal insulin. Do not stop basal insulin.   Need to follow up antibody testing in outpatient setting --> received but not yet resulted.   Patient/family should contact outline endocrine provider, Dr Cardoza with any concerns such as hypoglycemia or hyperglycemia in outpatient setting.   Discussed with primary team Renay Dietrich (NP)  Endocrine

## 2025-05-11 LAB — ISLET CELL512 AB SER-ACNC: SIGNIFICANT CHANGE UP

## 2025-05-13 LAB
GAD65 AB SER-MCNC: 0 NMOL/L — SIGNIFICANT CHANGE UP
ISLET CELL512 AB SER-SCNC: 0 NMOL/L — SIGNIFICANT CHANGE UP

## 2025-05-19 LAB — ZINC TRANSPORTER 8 AB, RESULT: <15 U/ML — SIGNIFICANT CHANGE UP

## 2025-05-22 PROBLEM — K86.2 CYST OF PANCREAS: Chronic | Status: ACTIVE | Noted: 2025-05-04

## 2025-05-22 PROBLEM — R91.8 OTHER NONSPECIFIC ABNORMAL FINDING OF LUNG FIELD: Chronic | Status: ACTIVE | Noted: 2025-05-04

## 2025-05-22 PROBLEM — K21.9 GASTRO-ESOPHAGEAL REFLUX DISEASE WITHOUT ESOPHAGITIS: Chronic | Status: ACTIVE | Noted: 2025-05-04

## 2025-05-22 PROBLEM — K76.0 FATTY (CHANGE OF) LIVER, NOT ELSEWHERE CLASSIFIED: Chronic | Status: ACTIVE | Noted: 2025-05-04

## 2025-05-22 PROBLEM — K29.70 GASTRITIS, UNSPECIFIED, WITHOUT BLEEDING: Chronic | Status: ACTIVE | Noted: 2025-05-04

## 2025-05-22 PROBLEM — Z96.1 PRESENCE OF INTRAOCULAR LENS: Chronic | Status: ACTIVE | Noted: 2025-05-04

## 2025-05-22 PROBLEM — E11.3599 TYPE 2 DIABETES MELLITUS WITH PROLIFERATIVE DIABETIC RETINOPATHY WITHOUT MACULAR EDEMA, UNSPECIFIED EYE: Chronic | Status: ACTIVE | Noted: 2025-05-04

## 2025-09-02 ENCOUNTER — RX RENEWAL (OUTPATIENT)
Age: 70
End: 2025-09-02

## 2025-09-02 RX ORDER — BLOOD SUGAR DIAGNOSTIC
STRIP MISCELLANEOUS
Qty: 120 | Refills: 3 | Status: ACTIVE | COMMUNITY
Start: 2025-09-02 | End: 1900-01-01

## 2025-09-02 RX ORDER — ISOPROPYL ALCOHOL 70 ML/100ML
SWAB TOPICAL
Qty: 2 | Refills: 0 | Status: ACTIVE | COMMUNITY
Start: 2025-09-02 | End: 1900-01-01

## 2025-09-02 RX ORDER — LANCETS 33 GAUGE
EACH MISCELLANEOUS
Qty: 120 | Refills: 0 | Status: ACTIVE | COMMUNITY
Start: 2025-09-02 | End: 1900-01-01

## 2025-09-15 ENCOUNTER — NON-APPOINTMENT (OUTPATIENT)
Age: 70
End: 2025-09-15

## 2025-09-17 ENCOUNTER — APPOINTMENT (OUTPATIENT)
Dept: PULMONOLOGY | Facility: CLINIC | Age: 70
End: 2025-09-17
Payer: MEDICARE

## 2025-09-17 VITALS
WEIGHT: 130 LBS | HEART RATE: 78 BPM | SYSTOLIC BLOOD PRESSURE: 128 MMHG | BODY MASS INDEX: 25.39 KG/M2 | DIASTOLIC BLOOD PRESSURE: 80 MMHG | TEMPERATURE: 97.8 F | OXYGEN SATURATION: 99 %

## 2025-09-17 DIAGNOSIS — J47.9 BRONCHIECTASIS, UNCOMPLICATED: ICD-10-CM

## 2025-09-17 DIAGNOSIS — R91.1 SOLITARY PULMONARY NODULE: ICD-10-CM

## 2025-09-17 DIAGNOSIS — J45.909 UNSPECIFIED ASTHMA, UNCOMPLICATED: ICD-10-CM

## 2025-09-17 PROCEDURE — 99214 OFFICE O/P EST MOD 30 MIN: CPT

## 2025-09-18 ENCOUNTER — EMERGENCY (EMERGENCY)
Facility: HOSPITAL | Age: 70
LOS: 1 days | End: 2025-09-18
Attending: STUDENT IN AN ORGANIZED HEALTH CARE EDUCATION/TRAINING PROGRAM
Payer: COMMERCIAL

## 2025-09-18 VITALS
WEIGHT: 139.99 LBS | SYSTOLIC BLOOD PRESSURE: 171 MMHG | TEMPERATURE: 98 F | HEART RATE: 67 BPM | HEIGHT: 62 IN | OXYGEN SATURATION: 99 % | DIASTOLIC BLOOD PRESSURE: 88 MMHG | RESPIRATION RATE: 20 BRPM

## 2025-09-18 VITALS
DIASTOLIC BLOOD PRESSURE: 75 MMHG | RESPIRATION RATE: 17 BRPM | HEART RATE: 70 BPM | TEMPERATURE: 98 F | SYSTOLIC BLOOD PRESSURE: 136 MMHG | OXYGEN SATURATION: 98 %

## 2025-09-18 DIAGNOSIS — Z90.89 ACQUIRED ABSENCE OF OTHER ORGANS: Chronic | ICD-10-CM

## 2025-09-18 LAB
ALBUMIN SERPL ELPH-MCNC: 4.2 G/DL — SIGNIFICANT CHANGE UP (ref 3.3–5)
ALP SERPL-CCNC: 66 U/L — SIGNIFICANT CHANGE UP (ref 40–120)
ALT FLD-CCNC: 19 U/L — SIGNIFICANT CHANGE UP (ref 10–45)
ANION GAP SERPL CALC-SCNC: 14 MMOL/L — SIGNIFICANT CHANGE UP (ref 5–17)
ANION GAP SERPL CALC-SCNC: 18 MMOL/L — HIGH (ref 5–17)
APTT BLD: 25.1 SEC — LOW (ref 26.1–36.8)
AST SERPL-CCNC: 42 U/L — HIGH (ref 10–40)
BASOPHILS # BLD AUTO: 0.07 K/UL — SIGNIFICANT CHANGE UP (ref 0–0.2)
BASOPHILS NFR BLD AUTO: 0.9 % — SIGNIFICANT CHANGE UP (ref 0–2)
BILIRUB SERPL-MCNC: 0.4 MG/DL — SIGNIFICANT CHANGE UP (ref 0.2–1.2)
BUN SERPL-MCNC: 16 MG/DL — SIGNIFICANT CHANGE UP (ref 7–23)
BUN SERPL-MCNC: 18 MG/DL — SIGNIFICANT CHANGE UP (ref 7–23)
CALCIUM SERPL-MCNC: 9.2 MG/DL — SIGNIFICANT CHANGE UP (ref 8.4–10.5)
CALCIUM SERPL-MCNC: 9.7 MG/DL — SIGNIFICANT CHANGE UP (ref 8.4–10.5)
CHLORIDE SERPL-SCNC: 102 MMOL/L — SIGNIFICANT CHANGE UP (ref 96–108)
CHLORIDE SERPL-SCNC: 99 MMOL/L — SIGNIFICANT CHANGE UP (ref 96–108)
CO2 SERPL-SCNC: 18 MMOL/L — LOW (ref 22–31)
CO2 SERPL-SCNC: 23 MMOL/L — SIGNIFICANT CHANGE UP (ref 22–31)
CREAT SERPL-MCNC: 0.52 MG/DL — SIGNIFICANT CHANGE UP (ref 0.5–1.3)
CREAT SERPL-MCNC: 0.55 MG/DL — SIGNIFICANT CHANGE UP (ref 0.5–1.3)
EGFR: 98 ML/MIN/1.73M2 — SIGNIFICANT CHANGE UP
EGFR: 98 ML/MIN/1.73M2 — SIGNIFICANT CHANGE UP
EGFR: 99 ML/MIN/1.73M2 — SIGNIFICANT CHANGE UP
EGFR: 99 ML/MIN/1.73M2 — SIGNIFICANT CHANGE UP
EOSINOPHIL # BLD AUTO: 0.16 K/UL — SIGNIFICANT CHANGE UP (ref 0–0.5)
EOSINOPHIL NFR BLD AUTO: 2.1 % — SIGNIFICANT CHANGE UP (ref 0–6)
GAS PNL BLDV: SIGNIFICANT CHANGE UP
GLUCOSE SERPL-MCNC: 100 MG/DL — HIGH (ref 70–99)
GLUCOSE SERPL-MCNC: 111 MG/DL — HIGH (ref 70–99)
HCT VFR BLD CALC: 46.3 % — HIGH (ref 34.5–45)
HGB BLD-MCNC: 14.6 G/DL — SIGNIFICANT CHANGE UP (ref 11.5–15.5)
IMM GRANULOCYTES # BLD AUTO: 0.02 K/UL — SIGNIFICANT CHANGE UP (ref 0–0.07)
IMM GRANULOCYTES NFR BLD AUTO: 0.3 % — SIGNIFICANT CHANGE UP (ref 0–0.9)
INR BLD: 0.84 RATIO — LOW (ref 0.85–1.16)
LYMPHOCYTES # BLD AUTO: 2.63 K/UL — SIGNIFICANT CHANGE UP (ref 1–3.3)
LYMPHOCYTES NFR BLD AUTO: 34.4 % — SIGNIFICANT CHANGE UP (ref 13–44)
MCHC RBC-ENTMCNC: 28.1 PG — SIGNIFICANT CHANGE UP (ref 27–34)
MCHC RBC-ENTMCNC: 31.5 G/DL — LOW (ref 32–36)
MCV RBC AUTO: 89 FL — SIGNIFICANT CHANGE UP (ref 80–100)
MONOCYTES # BLD AUTO: 0.48 K/UL — SIGNIFICANT CHANGE UP (ref 0–0.9)
MONOCYTES NFR BLD AUTO: 6.3 % — SIGNIFICANT CHANGE UP (ref 2–14)
NEUTROPHILS # BLD AUTO: 4.29 K/UL — SIGNIFICANT CHANGE UP (ref 1.8–7.4)
NEUTROPHILS NFR BLD AUTO: 56 % — SIGNIFICANT CHANGE UP (ref 43–77)
NRBC # BLD AUTO: 0 K/UL — SIGNIFICANT CHANGE UP (ref 0–0)
NRBC # FLD: 0 K/UL — SIGNIFICANT CHANGE UP (ref 0–0)
NRBC BLD AUTO-RTO: 0 /100 WBCS — SIGNIFICANT CHANGE UP (ref 0–0)
PLATELET # BLD AUTO: 299 K/UL — SIGNIFICANT CHANGE UP (ref 150–400)
PMV BLD: 9.7 FL — SIGNIFICANT CHANGE UP (ref 7–13)
POTASSIUM SERPL-MCNC: 4.9 MMOL/L — SIGNIFICANT CHANGE UP (ref 3.5–5.3)
POTASSIUM SERPL-MCNC: 6 MMOL/L — HIGH (ref 3.5–5.3)
POTASSIUM SERPL-SCNC: 4.9 MMOL/L — SIGNIFICANT CHANGE UP (ref 3.5–5.3)
POTASSIUM SERPL-SCNC: 6 MMOL/L — HIGH (ref 3.5–5.3)
PROT SERPL-MCNC: 7.5 G/DL — SIGNIFICANT CHANGE UP (ref 6–8.3)
PROTHROM AB SERPL-ACNC: 9.8 SEC — LOW (ref 9.9–13.4)
RBC # BLD: 5.2 M/UL — SIGNIFICANT CHANGE UP (ref 3.8–5.2)
RBC # FLD: 13.3 % — SIGNIFICANT CHANGE UP (ref 10.3–14.5)
SODIUM SERPL-SCNC: 135 MMOL/L — SIGNIFICANT CHANGE UP (ref 135–145)
SODIUM SERPL-SCNC: 139 MMOL/L — SIGNIFICANT CHANGE UP (ref 135–145)
WBC # BLD: 7.65 K/UL — SIGNIFICANT CHANGE UP (ref 3.8–10.5)
WBC # FLD AUTO: 7.65 K/UL — SIGNIFICANT CHANGE UP (ref 3.8–10.5)

## 2025-09-18 PROCEDURE — 73562 X-RAY EXAM OF KNEE 3: CPT | Mod: 26,LT

## 2025-09-18 PROCEDURE — 70486 CT MAXILLOFACIAL W/O DYE: CPT | Mod: 26

## 2025-09-18 PROCEDURE — 93010 ELECTROCARDIOGRAM REPORT: CPT

## 2025-09-18 PROCEDURE — 73562 X-RAY EXAM OF KNEE 3: CPT

## 2025-09-18 PROCEDURE — 72170 X-RAY EXAM OF PELVIS: CPT

## 2025-09-18 PROCEDURE — 71045 X-RAY EXAM CHEST 1 VIEW: CPT

## 2025-09-18 PROCEDURE — 76377 3D RENDER W/INTRP POSTPROCES: CPT

## 2025-09-18 PROCEDURE — 84295 ASSAY OF SERUM SODIUM: CPT

## 2025-09-18 PROCEDURE — 85025 COMPLETE CBC W/AUTO DIFF WBC: CPT

## 2025-09-18 PROCEDURE — 82435 ASSAY OF BLOOD CHLORIDE: CPT

## 2025-09-18 PROCEDURE — 84132 ASSAY OF SERUM POTASSIUM: CPT

## 2025-09-18 PROCEDURE — 80053 COMPREHEN METABOLIC PANEL: CPT

## 2025-09-18 PROCEDURE — 72125 CT NECK SPINE W/O DYE: CPT | Mod: 26

## 2025-09-18 PROCEDURE — 70486 CT MAXILLOFACIAL W/O DYE: CPT

## 2025-09-18 PROCEDURE — 82962 GLUCOSE BLOOD TEST: CPT

## 2025-09-18 PROCEDURE — 70450 CT HEAD/BRAIN W/O DYE: CPT

## 2025-09-18 PROCEDURE — 83605 ASSAY OF LACTIC ACID: CPT

## 2025-09-18 PROCEDURE — 99285 EMERGENCY DEPT VISIT HI MDM: CPT

## 2025-09-18 PROCEDURE — 72170 X-RAY EXAM OF PELVIS: CPT | Mod: 26

## 2025-09-18 PROCEDURE — 82947 ASSAY GLUCOSE BLOOD QUANT: CPT

## 2025-09-18 PROCEDURE — 90715 TDAP VACCINE 7 YRS/> IM: CPT

## 2025-09-18 PROCEDURE — 80048 BASIC METABOLIC PNL TOTAL CA: CPT

## 2025-09-18 PROCEDURE — 82803 BLOOD GASES ANY COMBINATION: CPT

## 2025-09-18 PROCEDURE — 85014 HEMATOCRIT: CPT

## 2025-09-18 PROCEDURE — 85018 HEMOGLOBIN: CPT

## 2025-09-18 PROCEDURE — 85730 THROMBOPLASTIN TIME PARTIAL: CPT

## 2025-09-18 PROCEDURE — 76377 3D RENDER W/INTRP POSTPROCES: CPT | Mod: 26

## 2025-09-18 PROCEDURE — 85610 PROTHROMBIN TIME: CPT

## 2025-09-18 PROCEDURE — 71045 X-RAY EXAM CHEST 1 VIEW: CPT | Mod: 26

## 2025-09-18 PROCEDURE — 82330 ASSAY OF CALCIUM: CPT

## 2025-09-18 PROCEDURE — 70450 CT HEAD/BRAIN W/O DYE: CPT | Mod: 26

## 2025-09-18 PROCEDURE — 72125 CT NECK SPINE W/O DYE: CPT

## 2025-09-18 RX ADMIN — Medication 1000 MILLILITER(S): at 11:45

## 2025-09-18 RX ADMIN — Medication 2 MILLIGRAM(S): at 12:07

## (undated) DEVICE — CONTAINER FORMALIN 10% 20ML

## (undated) DEVICE — ELCTR ECG CONDUCTIVE ADHESIVE

## (undated) DEVICE — CATH IV SAFE BC 22G X 1" (BLUE)

## (undated) DEVICE — SALIVA EJECTOR (BLUE)

## (undated) DEVICE — BIOPSY FORCEP COLD DISP

## (undated) DEVICE — UNDERPAD LINEN SAVER 17 X 24"

## (undated) DEVICE — BALLOON US ENDO

## (undated) DEVICE — LUBRICATING JELLY HR ONE SHOT 3G

## (undated) DEVICE — CLAMP BX HOT RAD JAW 3

## (undated) DEVICE — BASIN EMESIS 10IN GRADUATED MAUVE

## (undated) DEVICE — CONTAINER FORMALIN 80ML YELLOW

## (undated) DEVICE — DRSG CURITY GAUZE SPONGE 4 X 4" 12-PLY NON-STERILE

## (undated) DEVICE — GOWN LG

## (undated) DEVICE — BIOPSY FORCEP RADIAL JAW 4 STANDARD WITH NEEDLE

## (undated) DEVICE — DRSG BANDAID 0.75X3"

## (undated) DEVICE — BITE BLOCK ADULT 20 X 27MM (GREEN)

## (undated) DEVICE — TUBING MEDI-VAC W MAXIGRIP CONNECTORS 1/4"X6'

## (undated) DEVICE — TUBING IV SET GRAVITY 3Y 100" MACRO

## (undated) DEVICE — PACK IV START WITH CHG

## (undated) DEVICE — DRSG 2X2

## (undated) DEVICE — DENTURE CUP PINK